# Patient Record
Sex: FEMALE | Race: BLACK OR AFRICAN AMERICAN | NOT HISPANIC OR LATINO | Employment: OTHER | URBAN - METROPOLITAN AREA
[De-identification: names, ages, dates, MRNs, and addresses within clinical notes are randomized per-mention and may not be internally consistent; named-entity substitution may affect disease eponyms.]

---

## 2017-10-19 ENCOUNTER — HOSPITAL ENCOUNTER (EMERGENCY)
Facility: HOSPITAL | Age: 42
Discharge: ANOTHER HOSPITAL | End: 2017-10-20
Attending: EMERGENCY MEDICINE
Payer: MEDICAID

## 2017-10-19 DIAGNOSIS — J96.90 RESPIRATORY FAILURE: Primary | ICD-10-CM

## 2017-10-19 DIAGNOSIS — Z98.890 HISTORY OF ESOPHAGEAL DILATATION: ICD-10-CM

## 2017-10-19 DIAGNOSIS — T17.908A ASPIRATION INTO AIRWAY, INITIAL ENCOUNTER: ICD-10-CM

## 2017-10-19 LAB
ALBUMIN SERPL BCP-MCNC: 3.2 G/DL
ALLENS TEST: ABNORMAL
ALLENS TEST: ABNORMAL
ALP SERPL-CCNC: 126 U/L
ALT SERPL W/O P-5'-P-CCNC: 50 U/L
ANION GAP SERPL CALC-SCNC: 9 MMOL/L
AST SERPL-CCNC: 27 U/L
B-HCG UR QL: NEGATIVE
BASOPHILS # BLD AUTO: 0.03 K/UL
BASOPHILS NFR BLD: 0.2 %
BILIRUB SERPL-MCNC: <0.1 MG/DL
BILIRUB UR QL STRIP: NEGATIVE
BNP SERPL-MCNC: 66 PG/ML
BUN SERPL-MCNC: 19 MG/DL
CALCIUM SERPL-MCNC: 8.8 MG/DL
CHLORIDE SERPL-SCNC: 103 MMOL/L
CLARITY UR REFRACT.AUTO: CLEAR
CO2 SERPL-SCNC: 27 MMOL/L
COLOR UR AUTO: YELLOW
CREAT SERPL-MCNC: 0.6 MG/DL
D DIMER PPP IA.FEU-MCNC: 6.5 MG/L FEU
DELSYS: ABNORMAL
DELSYS: ABNORMAL
DIFFERENTIAL METHOD: ABNORMAL
EOSINOPHIL # BLD AUTO: 0.1 K/UL
EOSINOPHIL NFR BLD: 0.6 %
ERYTHROCYTE [DISTWIDTH] IN BLOOD BY AUTOMATED COUNT: 17.2 %
ERYTHROCYTE [SEDIMENTATION RATE] IN BLOOD BY WESTERGREN METHOD: 16 MM/H
EST. GFR  (AFRICAN AMERICAN): >60 ML/MIN/1.73 M^2
EST. GFR  (NON AFRICAN AMERICAN): >60 ML/MIN/1.73 M^2
FIO2: 65
GLUCOSE SERPL-MCNC: 181 MG/DL
GLUCOSE UR QL STRIP: NEGATIVE
HCO3 UR-SCNC: 29.4 MMOL/L (ref 24–28)
HCO3 UR-SCNC: 31.3 MMOL/L (ref 24–28)
HCT VFR BLD AUTO: 37.6 %
HGB BLD-MCNC: 12.1 G/DL
HGB UR QL STRIP: NEGATIVE
KETONES UR QL STRIP: NEGATIVE
LACTATE SERPL-SCNC: 0.6 MMOL/L
LEUKOCYTE ESTERASE UR QL STRIP: NEGATIVE
LYMPHOCYTES # BLD AUTO: 2.5 K/UL
LYMPHOCYTES NFR BLD: 13.2 %
MCH RBC QN AUTO: 25.5 PG
MCHC RBC AUTO-ENTMCNC: 32.2 G/DL
MCV RBC AUTO: 79 FL
MODE: ABNORMAL
MODE: ABNORMAL
MONOCYTES # BLD AUTO: 0.9 K/UL
MONOCYTES NFR BLD: 4.8 %
NEUTROPHILS # BLD AUTO: 15 K/UL
NEUTROPHILS NFR BLD: 81 %
NITRITE UR QL STRIP: NEGATIVE
PCO2 BLDA: 50.8 MMHG (ref 35–45)
PCO2 BLDA: 98.1 MMHG (ref 35–45)
PEEP: 5
PH SMN: 7.11 [PH] (ref 7.35–7.45)
PH SMN: 7.37 [PH] (ref 7.35–7.45)
PH UR STRIP: 6 [PH] (ref 5–8)
PLATELET # BLD AUTO: 342 K/UL
PMV BLD AUTO: 10.5 FL
PO2 BLDA: 58 MMHG (ref 80–100)
PO2 BLDA: 63 MMHG (ref 80–100)
POC BE: 2 MMOL/L
POC BE: 4 MMOL/L
POC SATURATED O2: 76 % (ref 95–100)
POC SATURATED O2: 91 % (ref 95–100)
POTASSIUM SERPL-SCNC: 4 MMOL/L
PROT SERPL-MCNC: 7.8 G/DL
PROT UR QL STRIP: NEGATIVE
RBC # BLD AUTO: 4.74 M/UL
SAMPLE: ABNORMAL
SAMPLE: ABNORMAL
SITE: ABNORMAL
SITE: ABNORMAL
SODIUM SERPL-SCNC: 139 MMOL/L
SP GR UR STRIP: >=1.03 (ref 1–1.03)
SP02: 95
URN SPEC COLLECT METH UR: ABNORMAL
UROBILINOGEN UR STRIP-ACNC: NEGATIVE EU/DL
VT: 400
WBC # BLD AUTO: 18.55 K/UL

## 2017-10-19 PROCEDURE — 51702 INSERT TEMP BLADDER CATH: CPT

## 2017-10-19 PROCEDURE — 83880 ASSAY OF NATRIURETIC PEPTIDE: CPT

## 2017-10-19 PROCEDURE — 93010 ELECTROCARDIOGRAM REPORT: CPT | Mod: ,,, | Performed by: INTERNAL MEDICINE

## 2017-10-19 PROCEDURE — 83605 ASSAY OF LACTIC ACID: CPT

## 2017-10-19 PROCEDURE — 63600175 PHARM REV CODE 636 W HCPCS

## 2017-10-19 PROCEDURE — 85025 COMPLETE CBC W/AUTO DIFF WBC: CPT

## 2017-10-19 PROCEDURE — 31500 INSERT EMERGENCY AIRWAY: CPT

## 2017-10-19 PROCEDURE — 80053 COMPREHEN METABOLIC PANEL: CPT

## 2017-10-19 PROCEDURE — 85379 FIBRIN DEGRADATION QUANT: CPT

## 2017-10-19 PROCEDURE — 25000003 PHARM REV CODE 250: Performed by: EMERGENCY MEDICINE

## 2017-10-19 PROCEDURE — 99285 EMERGENCY DEPT VISIT HI MDM: CPT | Mod: 25

## 2017-10-19 PROCEDURE — 63600175 PHARM REV CODE 636 W HCPCS: Performed by: EMERGENCY MEDICINE

## 2017-10-19 PROCEDURE — 81003 URINALYSIS AUTO W/O SCOPE: CPT

## 2017-10-19 PROCEDURE — 81025 URINE PREGNANCY TEST: CPT

## 2017-10-19 PROCEDURE — 99900026 HC AIRWAY MAINTENANCE (STAT)

## 2017-10-19 PROCEDURE — 87040 BLOOD CULTURE FOR BACTERIA: CPT | Mod: 59

## 2017-10-19 PROCEDURE — 96375 TX/PRO/DX INJ NEW DRUG ADDON: CPT

## 2017-10-19 PROCEDURE — 96365 THER/PROPH/DIAG IV INF INIT: CPT | Mod: 59

## 2017-10-19 PROCEDURE — 99900035 HC TECH TIME PER 15 MIN (STAT)

## 2017-10-19 RX ORDER — TRAZODONE HYDROCHLORIDE 100 MG/1
100 TABLET ORAL NIGHTLY
COMMUNITY

## 2017-10-19 RX ORDER — PROPOFOL 10 MG/ML
10 INJECTION, EMULSION INTRAVENOUS
Status: COMPLETED | OUTPATIENT
Start: 2017-10-19 | End: 2017-10-19

## 2017-10-19 RX ORDER — ZOLPIDEM TARTRATE 12.5 MG/1
12.5 TABLET, FILM COATED, EXTENDED RELEASE ORAL NIGHTLY PRN
COMMUNITY

## 2017-10-19 RX ORDER — LORAZEPAM 1 MG/1
1 TABLET ORAL EVERY 6 HOURS PRN
COMMUNITY

## 2017-10-19 RX ORDER — MIDAZOLAM HYDROCHLORIDE 1 MG/ML
2 INJECTION INTRAMUSCULAR; INTRAVENOUS
Status: COMPLETED | OUTPATIENT
Start: 2017-10-19 | End: 2017-10-19

## 2017-10-19 RX ORDER — VALSARTAN 80 MG/1
80 TABLET ORAL DAILY
Status: ON HOLD | COMMUNITY
End: 2018-02-28 | Stop reason: CLARIF

## 2017-10-19 RX ORDER — OLANZAPINE 10 MG/1
15 TABLET ORAL NIGHTLY
COMMUNITY

## 2017-10-19 RX ORDER — SUCCINYLCHOLINE CHLORIDE 20 MG/ML
50 INJECTION INTRAMUSCULAR; INTRAVENOUS
Status: COMPLETED | OUTPATIENT
Start: 2017-10-19 | End: 2017-10-19

## 2017-10-19 RX ORDER — SODIUM CHLORIDE 9 MG/ML
125 INJECTION, SOLUTION INTRAVENOUS ONCE
Status: COMPLETED | OUTPATIENT
Start: 2017-10-19 | End: 2017-10-19

## 2017-10-19 RX ORDER — ZIPRASIDONE HYDROCHLORIDE 20 MG/1
20 CAPSULE ORAL 2 TIMES DAILY
COMMUNITY

## 2017-10-19 RX ORDER — MIDAZOLAM HYDROCHLORIDE 1 MG/ML
INJECTION INTRAMUSCULAR; INTRAVENOUS
Status: COMPLETED
Start: 2017-10-19 | End: 2017-10-19

## 2017-10-19 RX ORDER — FLUPHENAZINE HYDROCHLORIDE 10 MG/1
10 TABLET ORAL DAILY
COMMUNITY

## 2017-10-19 RX ORDER — PROPOFOL 10 MG/ML
INJECTION, EMULSION INTRAVENOUS
Status: DISCONTINUED
Start: 2017-10-19 | End: 2017-10-20 | Stop reason: HOSPADM

## 2017-10-19 RX ADMIN — MIDAZOLAM HYDROCHLORIDE 2 MG: 1 INJECTION, SOLUTION INTRAMUSCULAR; INTRAVENOUS at 08:10

## 2017-10-19 RX ADMIN — SUCCINYLCHOLINE CHLORIDE 50 MG: 20 INJECTION, SOLUTION INTRAMUSCULAR; INTRAVENOUS at 08:10

## 2017-10-19 RX ADMIN — PROPOFOL 10 MCG/KG/MIN: 10 INJECTION, EMULSION INTRAVENOUS at 09:10

## 2017-10-19 RX ADMIN — IOHEXOL 100 ML: 350 INJECTION, SOLUTION INTRAVENOUS at 11:10

## 2017-10-19 RX ADMIN — SODIUM CHLORIDE 125 ML/HR: 0.9 INJECTION, SOLUTION INTRAVENOUS at 10:10

## 2017-10-19 RX ADMIN — LORAZEPAM 1 MG: 2 INJECTION, SOLUTION INTRAMUSCULAR; INTRAVENOUS at 08:10

## 2017-10-19 RX ADMIN — MIDAZOLAM HYDROCHLORIDE 2 MG: 1 INJECTION INTRAMUSCULAR; INTRAVENOUS at 08:10

## 2017-10-20 VITALS
OXYGEN SATURATION: 99 % | DIASTOLIC BLOOD PRESSURE: 61 MMHG | WEIGHT: 90 LBS | SYSTOLIC BLOOD PRESSURE: 100 MMHG | TEMPERATURE: 95 F | RESPIRATION RATE: 16 BRPM | HEART RATE: 86 BPM

## 2017-10-20 PROCEDURE — 82803 BLOOD GASES ANY COMBINATION: CPT

## 2017-10-20 PROCEDURE — 63600175 PHARM REV CODE 636 W HCPCS: Performed by: EMERGENCY MEDICINE

## 2017-10-20 PROCEDURE — 31500 INSERT EMERGENCY AIRWAY: CPT

## 2017-10-20 PROCEDURE — 93005 ELECTROCARDIOGRAM TRACING: CPT

## 2017-10-20 PROCEDURE — 99900026 HC AIRWAY MAINTENANCE (STAT)

## 2017-10-20 PROCEDURE — 27100108

## 2017-10-20 PROCEDURE — 25500020 PHARM REV CODE 255: Performed by: EMERGENCY MEDICINE

## 2017-10-20 PROCEDURE — 94002 VENT MGMT INPAT INIT DAY: CPT

## 2017-10-20 PROCEDURE — 99900035 HC TECH TIME PER 15 MIN (STAT)

## 2017-10-20 PROCEDURE — 36600 WITHDRAWAL OF ARTERIAL BLOOD: CPT | Mod: 59

## 2017-10-20 RX ADMIN — PIPERACILLIN AND TAZOBACTAM 2.25 G: 4; .5 INJECTION, POWDER, LYOPHILIZED, FOR SOLUTION INTRAVENOUS; PARENTERAL at 12:10

## 2017-10-20 NOTE — ED PROVIDER NOTES
Encounter Date: 10/19/2017       History     Chief Complaint   Patient presents with    Respiratory Problem     Per [facility], Pt has PEG tube and Nursing Home thinks patient swallowed something.     Nursing home resident with fairly severe debility from cerebral palsy, PEG tube, apparently nonverbal at baseline.  Per family and nursing home staff, has a history of getting things and putting them in her mouth even though she is not supposed to have oral intake.  She was found to have respiratory difficulty, no direct evidence of aspiration or putting anything in her mouth, no other presenting history or complaints.  No stridor noted, no emesis, initial oxygen saturation saturations difficult to register but varied between 50s and 90s.  Corrected quickly to upper 90s and 100 with supplemental oxygen.  Noted on arrival to be alert, moderately distressed, thrashing around, with significant diffuse jugular venous distention, and poor cooperation with measures.  Early look in oropharynx reveals no evidence of foreign body or other material, but unable to directly visualize cords on unsedated exam.  Careful auscultation does not reveal decreased air entry, wheeze, tachypnea, stridor. Difficulty maintaining adequate oxygen saturation lead II early further reexamination of the oropharynx, this time significant food material was discovered, see intubation note.      The history is provided by the EMS personnel, the nursing home and a relative. No  was used.     Review of patient's allergies indicates:  No Known Allergies  Past Medical History:   Diagnosis Date    Aphasia     Cerebral palsy     Deaf      History reviewed. No pertinent surgical history.  History reviewed. No pertinent family history.  Social History   Substance Use Topics    Smoking status: Not on file    Smokeless tobacco: Not on file    Alcohol use Not on file     Review of Systems   Unable to perform ROS: Patient nonverbal        Physical Exam     Initial Vitals [10/19/17 2013]   BP Pulse Resp Temp SpO2   (!) 164/109 100 (!) 22 -- (!) 90 %      MAP       127.33         Physical Exam    Nursing note and vitals reviewed.  Constitutional: She is not diaphoretic. She appears distressed.   HENT:   Head: Normocephalic and atraumatic.   Mouth/Throat: No oropharyngeal exudate.   Nonsedated exam shortly after arrival using laryngoscope reveals good visibility of the posterior oropharynx and epiglottis with no evidence of foreign body, emesis, or other material.  Due to patient resistance, unable to directly visualize the vocal cords, would need sedation for that.   Eyes: Conjunctivae and EOM are normal. Pupils are equal, round, and reactive to light. Right eye exhibits no discharge. Left eye exhibits no discharge. No scleral icterus.   Neck: Normal range of motion. Neck supple. No thyromegaly present. No tracheal deviation present. No JVD present.   Cardiovascular: Normal rate, regular rhythm, normal heart sounds and intact distal pulses. Exam reveals no gallop and no friction rub.    No murmur heard.  Significant JVD   Pulmonary/Chest: Breath sounds normal. No stridor. No respiratory distress. She has no wheezes. She has no rhonchi. She has no rales. She exhibits no tenderness.   No stridor, rales, rhonchi, or wheezing is appreciated.  Fairly good air entry bilaterally.  Symmetric in all lung fields.   Abdominal: Soft. Bowel sounds are normal. She exhibits no distension and no mass. There is no tenderness. There is no rebound and no guarding.   Musculoskeletal: Normal range of motion. She exhibits no edema or tenderness.   Diffuse moderately severe muscular wasting   Neurological: She is alert and oriented to person, place, and time. She has normal strength.   Deaf/ moderately severe debility from CP/ alert/ resists all exam/ interventions   Skin: Skin is warm and dry. No rash and no abscess noted. No erythema.   Psychiatric: She has a normal  mood and affect. Her behavior is normal. Judgment and thought content normal.       20:30 - I inserted 18 ga EJ peripheral IV in left EJ due to difficult peripheral access; standard sterile technique without complication.    ED Course   Intubation  Date/Time: 10/19/2017 9:01 PM  Location procedure was performed: [facility] EMERGENCY DEPARTMENT  Performed by: PARISH LOZANO  Authorized by: PARISH LOZANO   Pre-operative diagnosis: Respiratory failure/ suspected aspiration  Post-operative diagnosis: Same  Consent Done: Emergent Situation  Indications: respiratory failure  Description of findings: Particulate matter found at and around the vocal cords including fairly large pieces of fruits such as orange or pH, and some gastric material suggestive of regurgitated feeding tube material.  No explicit foreign body found in the proximal trachea.   Intubation method: direct  Patient status: paralyzed (RSI)  Preoxygenation: bag valve mask  Pretreatment medications: midazolam  Paralytic: succinylcholine  Laryngoscope size: Howard 4  Tube size: 7.0 mm  Tube type: cuffed  Number of attempts: 1  Ventilation between attempts: BVM  Cricoid pressure: no  Cords visualized: yes  Post-procedure assessment: chest rise and CO2 detector  Breath sounds: equal and rales/crackles  Cuff inflated: yes  ETT to lip: 23 cm  ETT to teeth: 24 cm  Tube secured with: umbilical tape  Chest x-ray interpreted by me.  Chest x-ray findings: endotracheal tube in appropriate position  Patient tolerance: Patient tolerated the procedure well with no immediate complications  Complications: No  Estimated blood loss (mL): 0  Specimens: No  Implants: No  Comments: Fairly extensive foreign material removed by direct visualization and suction extraction from the posterior oropharynx and around the vocal cords.  Rhythm remains sinus and blood pressure remained normal throughout, chassis heart rate in the 120s to 130s.  After intubation, oxygen saturation corrected  to 100% with reduction in heart rate to upper normal sinus.        Labs Reviewed   CBC W/ AUTO DIFFERENTIAL - Abnormal; Notable for the following:        Result Value    WBC 18.55 (*)     MCV 79 (*)     MCH 25.5 (*)     RDW 17.2 (*)     Gran # 15.0 (*)     Gran% 81.0 (*)     Lymph% 13.2 (*)     All other components within normal limits   COMPREHENSIVE METABOLIC PANEL - Abnormal; Notable for the following:     Glucose 181 (*)     Albumin 3.2 (*)     Total Bilirubin <0.1 (*)     ALT 50 (*)     All other components within normal limits   D DIMER, QUANTITATIVE - Abnormal; Notable for the following:     D-Dimer 6.50 (*)     All other components within normal limits   URINALYSIS - Abnormal; Notable for the following:     Specific Gravity, UA >=1.030 (*)     All other components within normal limits   ISTAT PROCEDURE - Abnormal; Notable for the following:     POC PH 7.112 (*)     POC PCO2 98.1 (*)     POC PO2 58 (*)     POC HCO3 31.3 (*)     POC SATURATED O2 76 (*)     All other components within normal limits   ISTAT PROCEDURE - Abnormal; Notable for the following:     POC PCO2 50.8 (*)     POC PO2 63 (*)     POC HCO3 29.4 (*)     POC SATURATED O2 91 (*)     All other components within normal limits   CULTURE, BLOOD   CULTURE, BLOOD   B-TYPE NATRIURETIC PEPTIDE   LACTIC ACID, PLASMA   PREGNANCY TEST, URINE RAPID   POCT GLUCOSE MONITORING CONTINUOUS     EKG Readings: (Independently Interpreted)   Normal sinus rhythm at 88 bpm, rightward axis, prolonged QT, slightly noisy baseline, otherwise unremarkable.  Borderline EKG.  No previous to compare.       Imaging Results          X-Ray Chest AP Portable (Final result)  Result time 10/19/17 21:59:55    Final result by Flor Lott MD (Timothy) (10/19/17 21:59:55)                 Impression:     Normal sized heart.Endotracheal tube appears to be in good position. Nasogastric tube is folded upon itself tip in the level of the mid to distal esophagus.  Peripheral lungs are  clear.      Electronically signed by: STEVIE BANEGAS MD  Date:     10/19/17  Time:    21:59              Narrative:    Chest, 1 view.    Clinical History: Endotracheal tube placement                             X-Ray Chest AP Portable (Final result)  Result time 10/19/17 20:56:50    Final result by Stevie Banegas MD (Timothy) (10/19/17 20:56:50)                 Impression:     Normal sized heart.Prominent mediastinum.  This could be related to a distended esophagus.  Other mediastinal mass cannot be excluded.  Correlate. If the patient does not have history of achalasia, would consider further evaluation with a CT scan of the chest.  The peripheral lungs appear clear.      Electronically signed by: STEVIE BANEGAS MD  Date:     10/19/17  Time:    20:56              Narrative:    Chest, 1 view.    Clinical History: Chest pain                                   Medical Decision Making:   History:   I obtained history from: someone other than patient.                   ED Course      9:08 PM See intubation note. Improved VS & oxygenation. Biting ETT.    9:23 PM ETT suctioned further, no gross particulate seen. Bite block in place, sedated with propofol, stable VS & O2 sats.    9:27 PM Family reports recently admitted/ intubated at [facility] under similar circumstances.    9:33 PM ETT enters right mainstem bronchus; NGT coiled in apparently dilated esophagus; both being repositioned.    9:43 PM ETT in good position; unable to place NGT in proper position - d/c'd.    10:58 PM Clinically stable; requiring significant sedation; CTA chest ordered.    11:03 PM ABG improved; titrating FIO2.    11:10 PM Discussed with family - at bedside - she has previous h/o similar episodes, sneaks other patient's food and aspirates.    11:41 PM Discussed the need for transfer with the patient's family who have verbalized understanding.  She is being transferred for inpatient intensive care services which are not available at this facility.   She'll be transferred by critical care unit with [facility], intubated in route with routine cardiopulmonary and sedation monitoring.  She will continue diprivan sedation en route, cardiac monitoring, IV fluids, IV antibiotics, and oxygen as initiated.  She is accepted for transfer to [facility] as an ER to ER transfer by Dr. Baker.  Risk of transfer includes worsening of condition and death.  Benefit of transfer includes appropriate inpatient care and treatment of recurrent aspiration with respiratory failure.    Clinical Impression:     1. Respiratory failure    2. Aspiration into airway, initial encounter                                 Vamsi Denton MD  10/19/17 2861

## 2017-10-20 NOTE — ED NOTES
Bed: 12  Expected date:   Expected time:   Means of arrival:   Comments:  RANJAN Denton MD  10/19/17 2005

## 2017-10-20 NOTE — ED NOTES
Patient arrived to ER via ambulance. Patient in respiratory distress, md notified, hand bagging began. MD arrived at bedside to assess patient. Patient sx orally for copious amounts of secretions and food particles. Patient breathing on her own, hand bagging stopped.  2046-ABG drawn  2050- No respirations noted, Hand bagging resumed.  2054- Patient intubated with 7.0 ET tube secured 22 cm at the teeth. Patient continues to be hand bagged. ET tube sx for copious thick secretions. HR remains stable.  2130- Patient placed on vent with the following settings: AC, vt 400, rate 16, peep +5 and FIO2 100%.  2230- FIO2 titrated down to 65%  2301- Repeat ABG drawn. Will continue to monitor patient.

## 2017-10-20 NOTE — ED NOTES
Attempted to insert OG, tube continues to coil in esophagus via x-ray. Attempted NG tube multiple times and tube continues to coil in esophagus as visualized on x-ray. MD Denton states ok to remove NG/OG tube.

## 2017-10-20 NOTE — ED NOTES
Unable to obtain temperature with triage vital signs. Temp not reading axillary and unable to obtain oral temp due to patient uncooperative.

## 2017-10-25 LAB
BACTERIA BLD CULT: NORMAL
BACTERIA BLD CULT: NORMAL

## 2017-11-09 ENCOUNTER — HOSPITAL ENCOUNTER (INPATIENT)
Facility: HOSPITAL | Age: 42
LOS: 5 days | Discharge: NURSING, REHAB OR LONG-TERM CARE | DRG: 208 | End: 2017-11-14
Attending: EMERGENCY MEDICINE | Admitting: FAMILY MEDICINE
Payer: MEDICAID

## 2017-11-09 DIAGNOSIS — Z01.818 ENCOUNTER FOR INTUBATION: ICD-10-CM

## 2017-11-09 DIAGNOSIS — J96.00 ACUTE RESPIRATORY FAILURE: ICD-10-CM

## 2017-11-09 DIAGNOSIS — R06.89 RESPIRATORY INSUFFICIENCY: Primary | ICD-10-CM

## 2017-11-09 DIAGNOSIS — K22.9 ABNORMALITY OF ESOPHAGUS: ICD-10-CM

## 2017-11-09 DIAGNOSIS — R00.1 BRADYCARDIA: ICD-10-CM

## 2017-11-09 DIAGNOSIS — J96.90 RESPIRATORY FAILURE, UNSPECIFIED CHRONICITY, UNSPECIFIED WHETHER WITH HYPOXIA OR HYPERCAPNIA: ICD-10-CM

## 2017-11-09 PROBLEM — E43 SEVERE PROTEIN-CALORIE MALNUTRITION: Status: ACTIVE | Noted: 2017-11-09

## 2017-11-09 PROBLEM — G80.9 CEREBRAL PALSY: Status: ACTIVE | Noted: 2017-11-09

## 2017-11-09 PROBLEM — T17.908A ASPIRATION INTO RESPIRATORY TRACT: Status: ACTIVE | Noted: 2017-11-09

## 2017-11-09 LAB
ALBUMIN SERPL BCP-MCNC: 3.3 G/DL
ALLENS TEST: ABNORMAL
ALP SERPL-CCNC: 77 U/L
ALT SERPL W/O P-5'-P-CCNC: 15 U/L
ANION GAP SERPL CALC-SCNC: 13 MMOL/L
AST SERPL-CCNC: 30 U/L
BASOPHILS # BLD AUTO: 0.04 K/UL
BASOPHILS NFR BLD: 0.2 %
BILIRUB SERPL-MCNC: 0.5 MG/DL
BILIRUB UR QL STRIP: NEGATIVE
BNP SERPL-MCNC: 23 PG/ML
BUN SERPL-MCNC: 15 MG/DL
CALCIUM SERPL-MCNC: 8.8 MG/DL
CHLORIDE SERPL-SCNC: 108 MMOL/L
CK SERPL-CCNC: 84 U/L
CLARITY UR REFRACT.AUTO: CLEAR
CO2 SERPL-SCNC: 20 MMOL/L
COLOR UR AUTO: YELLOW
CREAT SERPL-MCNC: 0.7 MG/DL
DELSYS: ABNORMAL
DIFFERENTIAL METHOD: ABNORMAL
EOSINOPHIL # BLD AUTO: 0.1 K/UL
EOSINOPHIL NFR BLD: 0.6 %
ERYTHROCYTE [DISTWIDTH] IN BLOOD BY AUTOMATED COUNT: 16.3 %
ERYTHROCYTE [SEDIMENTATION RATE] IN BLOOD BY WESTERGREN METHOD: 16 MM/H
EST. GFR  (AFRICAN AMERICAN): >60 ML/MIN/1.73 M^2
EST. GFR  (NON AFRICAN AMERICAN): >60 ML/MIN/1.73 M^2
FIO2: 50
GLUCOSE SERPL-MCNC: 106 MG/DL
GLUCOSE UR QL STRIP: NEGATIVE
HCO3 UR-SCNC: 28 MMOL/L (ref 24–28)
HCT VFR BLD AUTO: 40.7 %
HGB BLD-MCNC: 13.3 G/DL
HGB UR QL STRIP: NEGATIVE
KETONES UR QL STRIP: NEGATIVE
LACTATE SERPL-SCNC: 0.7 MMOL/L
LEUKOCYTE ESTERASE UR QL STRIP: NEGATIVE
LYMPHOCYTES # BLD AUTO: 4.3 K/UL
LYMPHOCYTES NFR BLD: 22.6 %
MCH RBC QN AUTO: 25.5 PG
MCHC RBC AUTO-ENTMCNC: 32.7 G/DL
MCV RBC AUTO: 78 FL
MODE: ABNORMAL
MONOCYTES # BLD AUTO: 1.1 K/UL
MONOCYTES NFR BLD: 5.5 %
NEUTROPHILS # BLD AUTO: 13.6 K/UL
NEUTROPHILS NFR BLD: 70.9 %
NITRITE UR QL STRIP: NEGATIVE
PCO2 BLDA: 42.4 MMHG (ref 35–45)
PEEP: 5
PH SMN: 7.43 [PH] (ref 7.35–7.45)
PH UR STRIP: 7 [PH] (ref 5–8)
PLATELET # BLD AUTO: 280 K/UL
PMV BLD AUTO: 11.5 FL
PO2 BLDA: 299 MMHG (ref 80–100)
POC BE: 4 MMOL/L
POC SATURATED O2: 100 % (ref 95–100)
POTASSIUM SERPL-SCNC: 4.8 MMOL/L
PROT SERPL-MCNC: 7.4 G/DL
PROT UR QL STRIP: ABNORMAL
RBC # BLD AUTO: 5.21 M/UL
SAMPLE: ABNORMAL
SITE: ABNORMAL
SODIUM SERPL-SCNC: 141 MMOL/L
SP GR UR STRIP: 1.02 (ref 1–1.03)
TROPONIN I SERPL DL<=0.01 NG/ML-MCNC: <0.006 NG/ML
URN SPEC COLLECT METH UR: ABNORMAL
UROBILINOGEN UR STRIP-ACNC: NEGATIVE EU/DL
VT: 350
WBC # BLD AUTO: 19.17 K/UL

## 2017-11-09 PROCEDURE — 63600175 PHARM REV CODE 636 W HCPCS: Performed by: EMERGENCY MEDICINE

## 2017-11-09 PROCEDURE — 82803 BLOOD GASES ANY COMBINATION: CPT | Performed by: GENERAL PRACTICE

## 2017-11-09 PROCEDURE — 81003 URINALYSIS AUTO W/O SCOPE: CPT

## 2017-11-09 PROCEDURE — 99900035 HC TECH TIME PER 15 MIN (STAT): Performed by: GENERAL PRACTICE

## 2017-11-09 PROCEDURE — 94002 VENT MGMT INPAT INIT DAY: CPT | Performed by: GENERAL PRACTICE

## 2017-11-09 PROCEDURE — 0BH17EZ INSERTION OF ENDOTRACHEAL AIRWAY INTO TRACHEA, VIA NATURAL OR ARTIFICIAL OPENING: ICD-10-PCS | Performed by: EMERGENCY MEDICINE

## 2017-11-09 PROCEDURE — 25000003 PHARM REV CODE 250

## 2017-11-09 PROCEDURE — 63600175 PHARM REV CODE 636 W HCPCS

## 2017-11-09 PROCEDURE — 96365 THER/PROPH/DIAG IV INF INIT: CPT

## 2017-11-09 PROCEDURE — 85025 COMPLETE CBC W/AUTO DIFF WBC: CPT

## 2017-11-09 PROCEDURE — 87186 SC STD MICRODIL/AGAR DIL: CPT

## 2017-11-09 PROCEDURE — 96366 THER/PROPH/DIAG IV INF ADDON: CPT

## 2017-11-09 PROCEDURE — 87040 BLOOD CULTURE FOR BACTERIA: CPT

## 2017-11-09 PROCEDURE — 83605 ASSAY OF LACTIC ACID: CPT

## 2017-11-09 PROCEDURE — 80053 COMPREHEN METABOLIC PANEL: CPT

## 2017-11-09 PROCEDURE — 94761 N-INVAS EAR/PLS OXIMETRY MLT: CPT | Performed by: GENERAL PRACTICE

## 2017-11-09 PROCEDURE — 25000003 PHARM REV CODE 250: Performed by: INTERNAL MEDICINE

## 2017-11-09 PROCEDURE — 83880 ASSAY OF NATRIURETIC PEPTIDE: CPT

## 2017-11-09 PROCEDURE — 20000000 HC ICU ROOM

## 2017-11-09 PROCEDURE — 82550 ASSAY OF CK (CPK): CPT

## 2017-11-09 PROCEDURE — 31500 INSERT EMERGENCY AIRWAY: CPT | Performed by: GENERAL PRACTICE

## 2017-11-09 PROCEDURE — 93010 ELECTROCARDIOGRAM REPORT: CPT | Mod: ,,, | Performed by: INTERNAL MEDICINE

## 2017-11-09 PROCEDURE — 5A1945Z RESPIRATORY VENTILATION, 24-96 CONSECUTIVE HOURS: ICD-10-PCS | Performed by: EMERGENCY MEDICINE

## 2017-11-09 PROCEDURE — 96368 THER/DIAG CONCURRENT INF: CPT

## 2017-11-09 PROCEDURE — 87088 URINE BACTERIA CULTURE: CPT

## 2017-11-09 PROCEDURE — 99900029 HC O2 SETUP (STAT): Performed by: GENERAL PRACTICE

## 2017-11-09 PROCEDURE — 31500 INSERT EMERGENCY AIRWAY: CPT

## 2017-11-09 PROCEDURE — 99291 CRITICAL CARE FIRST HOUR: CPT

## 2017-11-09 PROCEDURE — 87077 CULTURE AEROBIC IDENTIFY: CPT

## 2017-11-09 PROCEDURE — 25000003 PHARM REV CODE 250: Performed by: EMERGENCY MEDICINE

## 2017-11-09 PROCEDURE — 36600 WITHDRAWAL OF ARTERIAL BLOOD: CPT | Performed by: GENERAL PRACTICE

## 2017-11-09 PROCEDURE — 27000221 HC OXYGEN, UP TO 24 HOURS: Performed by: GENERAL PRACTICE

## 2017-11-09 PROCEDURE — 84484 ASSAY OF TROPONIN QUANT: CPT

## 2017-11-09 PROCEDURE — 63600175 PHARM REV CODE 636 W HCPCS: Performed by: INTERNAL MEDICINE

## 2017-11-09 PROCEDURE — 99900026 HC AIRWAY MAINTENANCE (STAT): Performed by: GENERAL PRACTICE

## 2017-11-09 PROCEDURE — 87086 URINE CULTURE/COLONY COUNT: CPT

## 2017-11-09 RX ORDER — HYDRALAZINE HYDROCHLORIDE 20 MG/ML
10 INJECTION INTRAMUSCULAR; INTRAVENOUS EVERY 6 HOURS PRN
Status: DISCONTINUED | OUTPATIENT
Start: 2017-11-09 | End: 2017-11-14 | Stop reason: HOSPADM

## 2017-11-09 RX ORDER — PROPOFOL 10 MG/ML
5 INJECTION, EMULSION INTRAVENOUS CONTINUOUS
Status: DISCONTINUED | OUTPATIENT
Start: 2017-11-09 | End: 2017-11-09 | Stop reason: SDUPTHER

## 2017-11-09 RX ORDER — PROPRANOLOL HYDROCHLORIDE 10 MG/1
10 TABLET ORAL 3 TIMES DAILY
Status: ON HOLD | COMMUNITY
End: 2018-02-28 | Stop reason: CLARIF

## 2017-11-09 RX ORDER — BENZTROPINE MESYLATE 1 MG/1
1 TABLET ORAL 2 TIMES DAILY
Status: ON HOLD | COMMUNITY
End: 2018-02-28 | Stop reason: CLARIF

## 2017-11-09 RX ORDER — RABEPRAZOLE SODIUM 20 MG/1
20 TABLET, DELAYED RELEASE ORAL DAILY
Status: ON HOLD | COMMUNITY
End: 2018-02-28 | Stop reason: CLARIF

## 2017-11-09 RX ORDER — PANTOPRAZOLE SODIUM 40 MG/10ML
40 INJECTION, POWDER, LYOPHILIZED, FOR SOLUTION INTRAVENOUS DAILY
Status: DISCONTINUED | OUTPATIENT
Start: 2017-11-10 | End: 2017-11-14

## 2017-11-09 RX ORDER — ETOMIDATE 2 MG/ML
20 INJECTION INTRAVENOUS
Status: COMPLETED | OUTPATIENT
Start: 2017-11-09 | End: 2017-11-09

## 2017-11-09 RX ORDER — ENOXAPARIN SODIUM 100 MG/ML
30 INJECTION SUBCUTANEOUS EVERY 24 HOURS
Status: DISCONTINUED | OUTPATIENT
Start: 2017-11-09 | End: 2017-11-14 | Stop reason: HOSPADM

## 2017-11-09 RX ORDER — ACETAMINOPHEN 325 MG/1
650 TABLET ORAL EVERY 6 HOURS PRN
Status: DISCONTINUED | OUTPATIENT
Start: 2017-11-09 | End: 2017-11-14 | Stop reason: HOSPADM

## 2017-11-09 RX ORDER — PROPOFOL 10 MG/ML
5 INJECTION, EMULSION INTRAVENOUS CONTINUOUS
Status: DISCONTINUED | OUTPATIENT
Start: 2017-11-09 | End: 2017-11-11

## 2017-11-09 RX ORDER — CIPROFLOXACIN 2 MG/ML
400 INJECTION, SOLUTION INTRAVENOUS
Status: DISCONTINUED | OUTPATIENT
Start: 2017-11-09 | End: 2017-11-10

## 2017-11-09 RX ORDER — PROPOFOL 10 MG/ML
INJECTION, EMULSION INTRAVENOUS
Status: COMPLETED
Start: 2017-11-09 | End: 2017-11-09

## 2017-11-09 RX ORDER — PHENYTOIN 125 MG/5ML
4 SUSPENSION ORAL 3 TIMES DAILY
COMMUNITY
End: 2017-12-10

## 2017-11-09 RX ORDER — MEGESTROL ACETATE 40 MG/1
40 TABLET ORAL DAILY
Status: ON HOLD | COMMUNITY
End: 2018-02-28 | Stop reason: CLARIF

## 2017-11-09 RX ORDER — HYDROXYZINE HYDROCHLORIDE 25 MG/1
25 TABLET, FILM COATED ORAL 3 TIMES DAILY
Status: ON HOLD | COMMUNITY
End: 2018-02-28 | Stop reason: CLARIF

## 2017-11-09 RX ORDER — PROPOFOL 10 MG/ML
5 INJECTION, EMULSION INTRAVENOUS
Status: COMPLETED | OUTPATIENT
Start: 2017-11-09 | End: 2017-11-09

## 2017-11-09 RX ORDER — ONDANSETRON 2 MG/ML
4 INJECTION INTRAMUSCULAR; INTRAVENOUS EVERY 8 HOURS PRN
Status: DISCONTINUED | OUTPATIENT
Start: 2017-11-09 | End: 2017-11-14 | Stop reason: HOSPADM

## 2017-11-09 RX ORDER — SODIUM CHLORIDE 9 MG/ML
INJECTION, SOLUTION INTRAVENOUS CONTINUOUS
Status: DISCONTINUED | OUTPATIENT
Start: 2017-11-09 | End: 2017-11-12

## 2017-11-09 RX ADMIN — ENOXAPARIN SODIUM 30 MG: 100 INJECTION SUBCUTANEOUS at 10:11

## 2017-11-09 RX ADMIN — SODIUM CHLORIDE 1000 ML: 0.9 INJECTION, SOLUTION INTRAVENOUS at 01:11

## 2017-11-09 RX ADMIN — CEFTRIAXONE SODIUM 1 G: 1 INJECTION, POWDER, FOR SOLUTION INTRAMUSCULAR; INTRAVENOUS at 04:11

## 2017-11-09 RX ADMIN — SODIUM CHLORIDE 200 ML: 900 INJECTION, SOLUTION INTRAVENOUS at 02:11

## 2017-11-09 RX ADMIN — ETOMIDATE 20 MG: 2 INJECTION, SOLUTION INTRAVENOUS at 12:11

## 2017-11-09 RX ADMIN — PROPOFOL 35 MCG/KG/MIN: 10 INJECTION, EMULSION INTRAVENOUS at 06:11

## 2017-11-09 RX ADMIN — PROPOFOL 1000 MG: 10 INJECTION, EMULSION INTRAVENOUS at 01:11

## 2017-11-09 RX ADMIN — SODIUM CHLORIDE: 0.9 INJECTION, SOLUTION INTRAVENOUS at 05:11

## 2017-11-09 RX ADMIN — CIPROFLOXACIN 400 MG: 2 INJECTION, SOLUTION INTRAVENOUS at 10:11

## 2017-11-09 RX ADMIN — PROPOFOL 35 MCG/KG/MIN: 10 INJECTION, EMULSION INTRAVENOUS at 07:11

## 2017-11-09 RX ADMIN — PIPERACILLIN SODIUM AND TAZOBACTAM SODIUM 4.5 G: 4; .5 INJECTION, POWDER, LYOPHILIZED, FOR SOLUTION INTRAVENOUS at 10:11

## 2017-11-09 NOTE — ED NOTES
Patient arrived to ED via [facility]. Bag valve mask ventilations in progress.  Agonal respirations noted 8-10 breaths per minute.  Oxygen saturation 100%.  Preparing for intubation. Dr. Ramirez at bedside.
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
## 2017-11-10 ENCOUNTER — ANESTHESIA EVENT (OUTPATIENT)
Dept: ENDOSCOPY | Facility: HOSPITAL | Age: 42
DRG: 208 | End: 2017-11-10
Payer: MEDICAID

## 2017-11-10 PROBLEM — R00.1 BRADYCARDIA: Status: ACTIVE | Noted: 2017-11-10

## 2017-11-10 PROBLEM — G40.909 SEIZURE DISORDER: Chronic | Status: ACTIVE | Noted: 2017-11-10

## 2017-11-10 PROBLEM — T18.128A ESOPHAGEAL OBSTRUCTION DUE TO FOOD IMPACTION: Status: ACTIVE | Noted: 2017-11-10
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
## 2017-11-10 LAB
ALLENS TEST: ABNORMAL
ANION GAP SERPL CALC-SCNC: 7 MMOL/L
BASOPHILS # BLD AUTO: 0.02 K/UL
BASOPHILS NFR BLD: 0.3 %
BUN SERPL-MCNC: 9 MG/DL
CALCIUM SERPL-MCNC: 8.7 MG/DL
CHLORIDE SERPL-SCNC: 113 MMOL/L
CO2 SERPL-SCNC: 21 MMOL/L
CREAT SERPL-MCNC: 0.6 MG/DL
DELSYS: ABNORMAL
DELSYS: ABNORMAL
DIFFERENTIAL METHOD: ABNORMAL
EOSINOPHIL # BLD AUTO: 0.1 K/UL
EOSINOPHIL NFR BLD: 0.9 %
ERYTHROCYTE [DISTWIDTH] IN BLOOD BY AUTOMATED COUNT: 15.8 %
ERYTHROCYTE [SEDIMENTATION RATE] IN BLOOD BY WESTERGREN METHOD: 16 MM/H
ERYTHROCYTE [SEDIMENTATION RATE] IN BLOOD BY WESTERGREN METHOD: 16 MM/H
EST. GFR  (AFRICAN AMERICAN): >60 ML/MIN/1.73 M^2
EST. GFR  (NON AFRICAN AMERICAN): >60 ML/MIN/1.73 M^2
FIO2: 35
FIO2: 40
GLUCOSE SERPL-MCNC: 95 MG/DL
HCO3 UR-SCNC: 20.6 MMOL/L (ref 24–28)
HCO3 UR-SCNC: 24.9 MMOL/L (ref 24–28)
HCT VFR BLD AUTO: 30.6 %
HGB BLD-MCNC: 9.9 G/DL
LYMPHOCYTES # BLD AUTO: 1.3 K/UL
LYMPHOCYTES NFR BLD: 18.8 %
MAGNESIUM SERPL-MCNC: 1.8 MG/DL
MCH RBC QN AUTO: 25.3 PG
MCHC RBC AUTO-ENTMCNC: 32.4 G/DL
MCV RBC AUTO: 78 FL
MODE: ABNORMAL
MODE: ABNORMAL
MONOCYTES # BLD AUTO: 0.4 K/UL
MONOCYTES NFR BLD: 5.5 %
NEUTROPHILS # BLD AUTO: 5 K/UL
NEUTROPHILS NFR BLD: 74.5 %
PCO2 BLDA: 33.5 MMHG (ref 35–45)
PCO2 BLDA: 40.2 MMHG (ref 35–45)
PEEP: 3
PEEP: 3
PH SMN: 7.32 [PH] (ref 7.35–7.45)
PH SMN: 7.48 [PH] (ref 7.35–7.45)
PHOSPHATE SERPL-MCNC: 2.1 MG/DL
PLATELET # BLD AUTO: 209 K/UL
PMV BLD AUTO: 10.3 FL
PO2 BLDA: 157 MMHG (ref 80–100)
PO2 BLDA: 216 MMHG (ref 80–100)
POC BE: -6 MMOL/L
POC BE: 1 MMOL/L
POC SATURATED O2: 100 % (ref 95–100)
POC SATURATED O2: 99 % (ref 95–100)
POCT GLUCOSE: 126 MG/DL (ref 70–110)
POTASSIUM SERPL-SCNC: 3.4 MMOL/L
PROCALCITONIN SERPL IA-MCNC: 0.12 NG/ML
RBC # BLD AUTO: 3.91 M/UL
SAMPLE: ABNORMAL
SAMPLE: ABNORMAL
SITE: ABNORMAL
SITE: ABNORMAL
SODIUM SERPL-SCNC: 141 MMOL/L
VT: 400
VT: 400
WBC # BLD AUTO: 6.76 K/UL

## 2017-11-10 PROCEDURE — 99291 CRITICAL CARE FIRST HOUR: CPT | Mod: ,,, | Performed by: NURSE PRACTITIONER

## 2017-11-10 PROCEDURE — 36415 COLL VENOUS BLD VENIPUNCTURE: CPT

## 2017-11-10 PROCEDURE — 36600 WITHDRAWAL OF ARTERIAL BLOOD: CPT

## 2017-11-10 PROCEDURE — 99232 SBSQ HOSP IP/OBS MODERATE 35: CPT | Mod: ,,, | Performed by: INTERNAL MEDICINE

## 2017-11-10 PROCEDURE — 25000003 PHARM REV CODE 250: Performed by: INTERNAL MEDICINE

## 2017-11-10 PROCEDURE — 20000000 HC ICU ROOM

## 2017-11-10 PROCEDURE — 87205 SMEAR GRAM STAIN: CPT

## 2017-11-10 PROCEDURE — 83735 ASSAY OF MAGNESIUM: CPT

## 2017-11-10 PROCEDURE — 63600175 PHARM REV CODE 636 W HCPCS

## 2017-11-10 PROCEDURE — 63600175 PHARM REV CODE 636 W HCPCS: Performed by: EMERGENCY MEDICINE

## 2017-11-10 PROCEDURE — 82803 BLOOD GASES ANY COMBINATION: CPT

## 2017-11-10 PROCEDURE — 84145 PROCALCITONIN (PCT): CPT

## 2017-11-10 PROCEDURE — 27200966 HC CLOSED SUCTION SYSTEM

## 2017-11-10 PROCEDURE — 85025 COMPLETE CBC W/AUTO DIFF WBC: CPT

## 2017-11-10 PROCEDURE — 63600175 PHARM REV CODE 636 W HCPCS: Performed by: INTERNAL MEDICINE

## 2017-11-10 PROCEDURE — 97802 MEDICAL NUTRITION INDIV IN: CPT

## 2017-11-10 PROCEDURE — 99900035 HC TECH TIME PER 15 MIN (STAT)

## 2017-11-10 PROCEDURE — 63700000 PHARM REV CODE 250 ALT 637 W/O HCPCS

## 2017-11-10 PROCEDURE — 93010 ELECTROCARDIOGRAM REPORT: CPT | Mod: ,,, | Performed by: INTERNAL MEDICINE

## 2017-11-10 PROCEDURE — 84100 ASSAY OF PHOSPHORUS: CPT

## 2017-11-10 PROCEDURE — 25000003 PHARM REV CODE 250: Performed by: EMERGENCY MEDICINE

## 2017-11-10 PROCEDURE — 94003 VENT MGMT INPAT SUBQ DAY: CPT

## 2017-11-10 PROCEDURE — 87070 CULTURE OTHR SPECIMN AEROBIC: CPT

## 2017-11-10 PROCEDURE — 25000003 PHARM REV CODE 250: Performed by: NURSE PRACTITIONER

## 2017-11-10 PROCEDURE — 80048 BASIC METABOLIC PNL TOTAL CA: CPT

## 2017-11-10 PROCEDURE — 93005 ELECTROCARDIOGRAM TRACING: CPT

## 2017-11-10 PROCEDURE — C9113 INJ PANTOPRAZOLE SODIUM, VIA: HCPCS | Performed by: EMERGENCY MEDICINE

## 2017-11-10 RX ORDER — CHLORHEXIDINE GLUCONATE ORAL RINSE 1.2 MG/ML
15 SOLUTION DENTAL 2 TIMES DAILY
Status: DISCONTINUED | OUTPATIENT
Start: 2017-11-10 | End: 2017-11-11

## 2017-11-10 RX ORDER — ATROPINE SULFATE 1 MG/ML
1 INJECTION, SOLUTION INTRAMUSCULAR; INTRAVENOUS; SUBCUTANEOUS
Status: DISCONTINUED | OUTPATIENT
Start: 2017-11-10 | End: 2017-11-14 | Stop reason: HOSPADM

## 2017-11-10 RX ORDER — PHENYTOIN 125 MG/5ML
100 SUSPENSION ORAL EVERY 8 HOURS
Status: DISCONTINUED | OUTPATIENT
Start: 2017-11-10 | End: 2017-11-14 | Stop reason: HOSPADM

## 2017-11-10 RX ORDER — PHENYTOIN 125 MG/5ML
100 SUSPENSION ORAL EVERY 8 HOURS
Status: DISCONTINUED | OUTPATIENT
Start: 2017-11-10 | End: 2017-11-10

## 2017-11-10 RX ORDER — LORAZEPAM 2 MG/ML
INJECTION INTRAMUSCULAR
Status: COMPLETED
Start: 2017-11-10 | End: 2017-11-10

## 2017-11-10 RX ADMIN — PANTOPRAZOLE SODIUM 40 MG: 40 INJECTION, POWDER, FOR SOLUTION INTRAVENOUS at 09:11

## 2017-11-10 RX ADMIN — PHENYTOIN 100 MG: 125 SUSPENSION ORAL at 05:11

## 2017-11-10 RX ADMIN — PIPERACILLIN SODIUM AND TAZOBACTAM SODIUM 4.5 G: 4; .5 INJECTION, POWDER, LYOPHILIZED, FOR SOLUTION INTRAVENOUS at 08:11

## 2017-11-10 RX ADMIN — VANCOMYCIN HYDROCHLORIDE 750 MG: 1 INJECTION, POWDER, LYOPHILIZED, FOR SOLUTION INTRAVENOUS at 03:11

## 2017-11-10 RX ADMIN — PROPOFOL 30 MCG/KG/MIN: 10 INJECTION, EMULSION INTRAVENOUS at 11:11

## 2017-11-10 RX ADMIN — ENOXAPARIN SODIUM 30 MG: 100 INJECTION SUBCUTANEOUS at 05:11

## 2017-11-10 RX ADMIN — SODIUM CHLORIDE 500 ML: 0.9 INJECTION, SOLUTION INTRAVENOUS at 05:11

## 2017-11-10 RX ADMIN — SODIUM CHLORIDE: 0.9 INJECTION, SOLUTION INTRAVENOUS at 03:11

## 2017-11-10 RX ADMIN — CIPROFLOXACIN 400 MG: 2 INJECTION, SOLUTION INTRAVENOUS at 11:11

## 2017-11-10 RX ADMIN — CHLORHEXIDINE GLUCONATE 15 ML: 1.2 RINSE ORAL at 09:11

## 2017-11-10 RX ADMIN — PHENYTOIN 100 MG: 125 SUSPENSION ORAL at 09:11

## 2017-11-10 RX ADMIN — LORAZEPAM 2 MG: 2 INJECTION INTRAMUSCULAR; INTRAVENOUS at 09:11

## 2017-11-10 RX ADMIN — PROPOFOL 40 MCG/KG/MIN: 10 INJECTION, EMULSION INTRAVENOUS at 03:11

## 2017-11-10 RX ADMIN — SODIUM CHLORIDE 75 ML/HR: 0.9 INJECTION, SOLUTION INTRAVENOUS at 05:11

## 2017-11-10 NOTE — ASSESSMENT & PLAN NOTE
Nutrition Problem  Malnutrition     Related to (etiology):   Inadequate energy intake     Signs and Symptoms (as evidenced by):   Per [facility] records weight loss of 6 lbs within 1 month (6%), per physical assessment ~ muscle mass loss.      Interventions/Recommendations (treatment strategy):  Refer to RD recs above    Nutrition Diagnosis Status:   Continues

## 2017-11-10 NOTE — HPI
42 year old female nursing home resident with PMH severe cerebral palsy, deaf, non-verbal, seizure disorder (not currently on antiepileptics), and  esophageal stricture with history of severe obstructing food bolus and aspiration; she has PEG for nutrition and meds however if food gets within her grasp she is not capable of comprehending the need for NPO status and continues to attempt to eat  She presented to [facility] ED with respiratory distress; she was intubated in ED s/t impending respiratory arrest with poor effort and unable to clear oral secretions  Further evaluation revealed leukocytosis along with CXR showing wide mediastinum with clear lung fields

## 2017-11-10 NOTE — CONSULTS
[facility] -   Adult Nutrition  Consult Note    SUMMARY     Recommendations    Recommendation/Intervention:   1.If unable to extubate, consider Nutren 1.5 at 25 ml/hr with 1 pack of beneprotein, with flushes as needed per MD or NP for hydration. With current propofol infusion, provides 1125 calories, 47 g protein, and 458 ml water.   2.When Pt extubated, consider Isosource 1.5 bolus 250 ml every 6 hours (or 4 cans daily total volume) with 50 ml flush before and after each can plus additional water flushes of 150 ml BID for hydration or as needed per MD (1500 kcal, 68 g protein, 1464 ml water).    3. Hold for residuals >500 mL 4. Will continue to monitor.     Goals: 1. Initiate nutrition within 72 hrs. 2. Meet 85- 100 % of estimated nutritional needs without s/s of GI distress   Nutrition Goal Status: new  Communication of RD Recs:  (care plan and sticky note)      Reason for Assessment    Reason for Assessment: physician consult   Diagnosis:  1. Respiratory insufficiency    2. Encounter for intubation    3. Respiratory failure, unspecified chronicity, unspecified whether with hypoxia or hypercapnia    4. Abnormality of esophagus    5. Acute respiratory failure    6. Bradycardia      Past Medical History:   Diagnosis Date    Anxiety     Aphasia     Cerebral palsy     Deaf     Depression     GERD (gastroesophageal reflux disease)     Insomnia     Pressure ulcer     Seizures            Interdisciplinary Rounds: attended     General Information Comments:  Per ICU staff: Patient intubated and sedated, patient is a resident of [facility], patient has a PEG for nutrition support. Pt seen this AM, no family members at bedside. Per epic records weight loss of 6 lbs within 1 month (6%), per physical assessment ~ muscle mass loss.     Nutrition Discharge Planning:  Isosource 1.5 bolus 250 ml every 6 hours (or 4 cans daily total volume) with 50 ml flush before and after each can plus additional water  flushes of 150 ml BID for hydration or as needed per MD (1500 kcal, 68 g protein, 1464 ml water).      Nutrition Prescription Ordered    Current Diet Order: NPO          Evaluation of Received Nutrients/Fluid Intake       Other Calories (kcal): 200.64 (Propofol 7.6 ml/hr)  Energy Calories Required: not meeting needs  % Kcal Needs: 18      Protein Required: not meeting needs  % Protein Needs: 0      % Intake of Estimated Energy Needs: 0 - 25 %  % Meal Intake: NPO     Nutrition Risk Screen     Nutrition Risk Screen: tube feeding or parenteral nutrition    Nutrition/Diet History       Typical Food/Fluid Intake: ALVERTO  Food Preferences: ALVERTO      Factors Affecting Nutritional Intake: NPO        Labs/Tests/Procedures/Meds       Pertinent Labs Reviewed: reviewed  Pertinent Labs Comments: Alb 3.3, Co2 20  Pertinent Medications Reviewed: reviewed       Physical Findings    Overall Physical Appearance: loss of muscle mass  Tubes:  (PEG)  Oral/Mouth Cavity:  (ALVERTO)  Skin:  (Gomez 11)    Anthropometrics    Temp: 96.1 °F (35.6 °C)     Height: 5' (152.4 cm)  Weight Method: Bed Scale  Weight: 38.4 kg (84 lb 10.5 oz)     Ideal Body Weight (IBW), Female: 100 lb     % Ideal Body Weight, Female (lb): 84.66 lb  BMI (Calculated): 16.6  BMI Grade: 16 - 16.9 protein-energy malnutrition grade II     Usual Body Weight (UBW), k.9 kg     % Usual Body Weight: 94.08  % Weight Change From Usual Weight: -6.11 %             Estimated/Assessed Needs    Weight Used For Calorie Calculations: 38.4 kg (84 lb 10.5 oz)   Height (cm): 152.4 cm  Energy Calorie Requirements (kcal): 1067  Energy Need Method: [place]    RMR ([place]-St. Jeor Equation): 975.5      Weight Used For Protein Calculations: 38.4 kg (84 lb 10.5 oz)     1.2 gm Protein (gm): 46.18 and 1.5 gm Protein (gm): 57.72     Fluid Need Method: RDA Method (or per MD)        RDA Method (mL): 1067               Assessment and Plan    * Acute respiratory failure    Nutrition  Problem  Inadequate energy intake    Related to (etiology):   Intubation     Signs and Symptoms (as evidenced by):    NPO, no alternative means of nutrition.     Interventions/Recommendations (treatment strategy):  See RD recs above    Nutrition Diagnosis Status:   New          Severe protein-calorie malnutrition    Nutrition Problem  Malnutrition     Related to (etiology):   Inadequate energy intake     Signs and Symptoms (as evidenced by):   Per epic records weight loss of 6 lbs within 1 month (6%), per physical assessment ~ muscle mass loss.      Interventions/Recommendations (treatment strategy):  Refer to RD recs above    Nutrition Diagnosis Status:   New              Monitor and Evaluation    Food and Nutrient Intake: energy intake, enteral nutrition intake  Food and Nutrient Adminstration: enteral and parenteral nutrition administration  Anthropometric Measurements: weight  Biochemical Data, Medical Tests and Procedures: electrolyte and renal panel, glucose/endocrine profile  Nutrition-Focused Physical Findings: overall appearance    Nutrition Follow-Up    RD Follow-up?: Yes (2xweekly)

## 2017-11-10 NOTE — ASSESSMENT & PLAN NOTE
Per report, large amount of oral secretions suctioned out.  Will empirically cover with broad spectrum IV antibiotics tonight.  De-escalate in AM as necessary.  Follow up on cultures.  No evidence of pneumonia of CXR.  WBC 19,000.

## 2017-11-10 NOTE — CONSULTS
Belinda Urbina [id number] is a 42 y.o. female who has been consulted for vancomycin dosing.  Dx: Pneumonia / goal trough 15-20  The patient has the following labs:     Date Creatinine (mg/dl)    BUN WBC Count   11/10/2017 Estimated Creatinine Clearance: 64.8 mL/min (based on SCr of 0.7 mg/dL). Lab Results   Component Value Date    BUN 15 11/09/2017     Lab Results   Component Value Date    WBC 19.17 (H) 11/09/2017      which calculates to an Estimated Creatinine Clearance: 64.8 mL/min (based on SCr of 0.7 mg/dL)..       Current weight is 38.4 kg (84 lb 10.5 oz)  Loading dose of 750mg  The patient will be started on vancomycin at a dose of 500 mg every 18 hours. Patient will be followed by pharmacy for changes in renal function, toxicity, and efficacy.  The vancomycin trough has been ordered for 11/11 at 14:30.      Thank you for allowing us to participate in this patient's care.     Chano Braga

## 2017-11-10 NOTE — HOSPITAL COURSE
11/10 - Transferred to ICU with OETT on mechanical ventilation and propofol sedation  11/11: EGD with removal of large amount secretion and scant solid food; tolerated SBT and extubation

## 2017-11-10 NOTE — ASSESSMENT & PLAN NOTE
Resident of [facility].  Need to implement stricter precautions at the NH so patient cannot ingest oral food.

## 2017-11-10 NOTE — PROGRESS NOTES
"[facility] Hospital Medicine  Progress Note    Patient Name: Belinda Urbina  MRN: [id number]  Patient Class: IP- Inpatient   Admission Date: 11/9/2017  Length of Stay: 1 days  Attending Physician: Susan Yang MD  Primary Care Provider: Santhosh Mendiola MD        Subjective:     Principal Problem:Acute respiratory failure    HPI:  Ms. Urbina is an unfortunate 41 y/o AA female with h/o cerebral palsy, aphasic, deafness was transferred from [facility] ED, intubated. No family around, history obtained from chart review.    Patient is a resident of [facility], has a PEG in place but continues to somehow get her hands on food and eats them. Per chart review she is supposed to be NPO at all times. She had aspirated in the past month and went into respiratory failure, was emergently intubated and was taken to [facility]. She had EGD done at that time, and had large amount of retained food removed from the esophagus. Eventually discharged back to the nursing home. Per chart review, patient had similar episode again earlier today, placed food in her mouth and aspirated. She woas found to have lot of secretions when EMS arrived. She was intubated en route to [facility], and was diverted to [facility] ED. Transferred here to [facility] ICU for further monitoring. Currently she remains intubated on propofol. No family at bedside.      Per H&P done at [facility] on 10/20/2017:  "Ms. Urbina is a 43yo F w/ a PMHx of severe cerebral palsy (deaf, non-verbal), s/p PEG, seizure disorder (previously on dilantin, no anti-epileptics on list from nursing home), and behavioral issues (prior respiratory failure after choking on food/foreign body) who was transferred from [facility] following intubation for acute hypoxemic respiratory failure. Pt was noted to be tachypneic and in distress at nursing home. She has a history of placing food in mouth even though she's not supposed to have any oral intake, and therefore, NH was " "concerned that this happened. On arrival to [facility], pt was noted to have O2 sats in 50's - 90s which corrected readily with supplemental oxygen. Pt's ABG had a pH of 7.1, CO2 of 98, O2 of 58, and HCO3 of 31. Physicians there were unable to evaluate vocal cords without sedated exam, and therefore, pt was intubated. Upon intubation, it was noted that pt had a large amount of fruit as well as aspirated tube feeds around her vocal cords. At [facility], pt was noted to have a leukocytosis of 18; however, the remainder of her labs were unremarkable. She had a CTA completed which was negative for pulmonary embolism but did revealed a patchy consolidation in the left lower lobe as well as marked esophageal dilation with irregularity at the gastroesophageal junction (seen on prior imaging as well, prior scope revealing severe esophagitis and possible gastroparesis, biopsies positive for candida unclear if ever treated for this)".    EGD done 10/22/17 at [facility]:    Esophagus: Very narrowed stricture at distal esophagus. Severe distal esophagitis. White plaque in esophagus, possibly c/w candida. Large amount of fluid and retained food (peaches). Removed with multiple passes with Adam net.    Stomach:Not examined  Duodenum: Not examined  Complications: None    Impression:  1. Severe distal esophagitis with very narrowed distal esophageal stricture.  2. Food bolus status post removal.  3. Possible candidal esophagitis.                     Hospital Course:  11/10 - Intubated and sedated.  Responds to pain. Difficulty with access. ICU NP attempting to place midline under US guidance. ABG this morning 7.47/33.5/216/24.9. Satting 100% on ventilation. WBC 6.76<--19.         Review of Systems   Unable to perform ROS: Intubated     Objective:     Vital Signs (Most Recent):  Temp: 96.1 °F (35.6 °C) (11/10/17 0302)  Pulse: 69 (11/10/17 1044)  Resp: 16 (11/10/17 0831)  BP: 117/82 (11/10/17 0901)  SpO2: 100 % (11/10/17 1044) Vital Signs (24h " Range):  Temp:  [96.1 °F (35.6 °C)-98.7 °F (37.1 °C)] 96.1 °F (35.6 °C)  Pulse:  [] 69  Resp:  [10-44] 16  SpO2:  [97 %-100 %] 100 %  BP: ()/() 117/82     Weight: 38.4 kg (84 lb 10.5 oz)  Body mass index is 16.53 kg/m².    Intake/Output Summary (Last 24 hours) at 11/10/17 1128  Last data filed at 11/10/17 0600   Gross per 24 hour   Intake          1896.36 ml   Output              875 ml   Net          1021.36 ml      Physical Exam   Constitutional: No distress. She is intubated.   Malnourished, cachectic, intubated   HENT:   Head: Normocephalic and atraumatic.   Eyes: Conjunctivae are normal. Pupils are equal, round, and reactive to light. No scleral icterus.   Cardiovascular: Normal rate, regular rhythm, normal heart sounds and intact distal pulses.    No murmur heard.  Pulmonary/Chest: Breath sounds normal. No accessory muscle usage. She is intubated.   Abdominal: Soft. She exhibits no distension.   Musculoskeletal: She exhibits no edema.   Lymphadenopathy:     She has no cervical adenopathy.   Neurological:   Intubated, sedated on propofol   Skin: Skin is warm. She is not diaphoretic. No erythema.   No skin breakdown noted   Nursing note and vitals reviewed.      Significant Labs:   Blood Culture: No results for input(s): LABBLOO in the last 48 hours.  BMP:   Recent Labs  Lab 11/10/17  1019   GLU 95      K 3.4*   *   CO2 21*   BUN 9   CREATININE 0.6   CALCIUM 8.7   MG 1.8     CBC:   Recent Labs  Lab 11/09/17  1410 11/10/17  1019   WBC 19.17* 6.76   HGB 13.3 9.9*   HCT 40.7 30.6*    209      Recent Labs  Lab 11/09/17  1600   LACTATE 0.7     Magnesium:   Recent Labs  Lab 11/10/17  1019   MG 1.8     Respiratory Culture: No results for input(s): GSRESP, RESPIRATORYC in the last 48 hours.  Troponin:   Recent Labs  Lab 11/09/17  1410   TROPONINI <0.006     All pertinent labs within the past 24 hours have been reviewed.    Significant Imaging: I have reviewed and interpreted all  pertinent imaging results/findings within the past 24 hours.     Imaging Results          X-Ray Chest 1 View (Final result)  Result time 11/10/17 08:01:37    Final result by DENIS Bran Sr., MD (11/10/17 08:01:37)                 Impression:        1. There has been interval placement of an NG tube. Its tip is projected over the inferior aspect of the T8 vertebral body. This is in the region of the distal one third of the esophagus.   2. There is a mild amount of interstitial and alveolar opacities seen in both lungs. There are Curly B-lines visualized bilaterally. This is characteristic of pulmonary edema.  3. An endotracheal tube remains in place.       Electronically signed by: DENIS BRAN MD  Date:     11/10/17  Time:    08:01              Narrative:    One-view chest x-ray    Clinical History: Respiratory distress    Finding: Comparison was made to a prior examination performed on 11/9/2017. An endotracheal tube remains in place. There has been interval placement of an NG tube. Its tip is projected over the inferior aspect of the T8 vertebral body. This is in the region of the distal one third of the esophagus. The size of the heart is normal. There is a mild amount of interstitial and alveolar opacities seen in both lungs. There are Curly B-lines visualized bilaterally. There is no pneumothorax.  The costophrenic angles are sharp.                             X-Ray Chest AP Portable (Final result)  Result time 11/09/17 13:36:44    Final result by Enrique Loera MD (11/09/17 13:36:44)                 Impression:       Widening of the upper mediastinum which correlates with CT findings of a dilated esophagus      Electronically signed by: ENRIQUE LOERA MD  Date:     11/09/17  Time:    13:36              Narrative:    Clinical Data:Preoperative    Comparison:  none    Findings:  Single view of the chest.   There is widening of the upper mediastinum which correlates with CT findings of a dilated esophagus with  ingested material throughout. Endotracheal tube appears satisfactory at the thoracic inlet.    Cardiac silhouette is normal.  The lungs demonstrate no evidence of active disease.  No evidence of pleural effusion or pneumothorax.  Bones appear intact. G-tube is seen in the upper abdomen.                                Assessment/Plan:      * Acute respiratory failure    Recurrent hospitalization for ingestion food and aspirating on it.  Intubated for respiratory distress in the field.  Consult pulmonary for vent management.  Likely can be extubated soon.            Severe protein-calorie malnutrition    BMI < 18  Nutrition consult.          Cerebral palsy    Resident of [facility].  Need to implement stricter precautions at the NH so patient cannot ingest oral food.          Aspiration into respiratory tract    Per report, large amount of oral secretions suctioned out.  Will continue coverage with broad spectrum IV antibiotics  De-escalate per pulmonology, appreciate recs  Follow up on cultures.  No evidence of pneumonia on CXR but is high risk as she did aspirate  WBC 19,000.              VTE Risk Mitigation         Ordered     enoxaparin injection 30 mg  Daily     Route:  Subcutaneous        11/09/17 2147     Medium Risk of VTE  Once      11/09/17 2111     Place LIANE hose  Until discontinued      11/09/17 2111     Place sequential compression device  Until discontinued      11/09/17 2111          Critical care time spent on the evaluation and treatment of severe organ dysfunction, review of pertinent labs and imaging studies, discussions with consulting providers and discussions with patient/family: 60 minutes.    Susan Yang MD  Department of Hospital Medicine   [facility]

## 2017-11-10 NOTE — ASSESSMENT & PLAN NOTE
Asymptomatic; sinus on 12 lead; not sedation related; monitor with prn atropine if falls below 30 or becomes symptomatic

## 2017-11-10 NOTE — SUBJECTIVE & OBJECTIVE
Review of Systems   Unable to perform ROS: Intubated     Objective:     Vital Signs (Most Recent):  Temp: 96.1 °F (35.6 °C) (11/10/17 0302)  Pulse: 69 (11/10/17 1044)  Resp: 16 (11/10/17 0831)  BP: 117/82 (11/10/17 0901)  SpO2: 100 % (11/10/17 1044) Vital Signs (24h Range):  Temp:  [96.1 °F (35.6 °C)-98.7 °F (37.1 °C)] 96.1 °F (35.6 °C)  Pulse:  [] 69  Resp:  [10-44] 16  SpO2:  [97 %-100 %] 100 %  BP: ()/() 117/82     Weight: 38.4 kg (84 lb 10.5 oz)  Body mass index is 16.53 kg/m².    Intake/Output Summary (Last 24 hours) at 11/10/17 1128  Last data filed at 11/10/17 0600   Gross per 24 hour   Intake          1896.36 ml   Output              875 ml   Net          1021.36 ml      Physical Exam   Constitutional: No distress. She is intubated.   Malnourished, cachectic, intubated   HENT:   Head: Normocephalic and atraumatic.   Eyes: Conjunctivae are normal. Pupils are equal, round, and reactive to light. No scleral icterus.   Cardiovascular: Normal rate, regular rhythm, normal heart sounds and intact distal pulses.    No murmur heard.  Pulmonary/Chest: Breath sounds normal. No accessory muscle usage. She is intubated.   Abdominal: Soft. She exhibits no distension.   Musculoskeletal: She exhibits no edema.   Lymphadenopathy:     She has no cervical adenopathy.   Neurological:   Intubated, sedated on propofol   Skin: Skin is warm. She is not diaphoretic. No erythema.   No skin breakdown noted   Nursing note and vitals reviewed.      Significant Labs:   Blood Culture: No results for input(s): LABBLOO in the last 48 hours.  BMP:   Recent Labs  Lab 11/10/17  1019   GLU 95      K 3.4*   *   CO2 21*   BUN 9   CREATININE 0.6   CALCIUM 8.7   MG 1.8     CBC:   Recent Labs  Lab 11/09/17  1410 11/10/17  1019   WBC 19.17* 6.76   HGB 13.3 9.9*   HCT 40.7 30.6*    209      Recent Labs  Lab 11/09/17  1600   LACTATE 0.7     Magnesium:   Recent Labs  Lab 11/10/17  1019   MG 1.8     Respiratory  Culture: No results for input(s): GSRESP, RESPIRATORYC in the last 48 hours.  Troponin:   Recent Labs  Lab 11/09/17  1410   TROPONINI <0.006     All pertinent labs within the past 24 hours have been reviewed.    Significant Imaging: I have reviewed and interpreted all pertinent imaging results/findings within the past 24 hours.     Imaging Results          X-Ray Chest 1 View (Final result)  Result time 11/10/17 08:01:37    Final result by DENIS Bran Sr., MD (11/10/17 08:01:37)                 Impression:        1. There has been interval placement of an NG tube. Its tip is projected over the inferior aspect of the T8 vertebral body. This is in the region of the distal one third of the esophagus.   2. There is a mild amount of interstitial and alveolar opacities seen in both lungs. There are Curly B-lines visualized bilaterally. This is characteristic of pulmonary edema.  3. An endotracheal tube remains in place.       Electronically signed by: DEINS BRAN MD  Date:     11/10/17  Time:    08:01              Narrative:    One-view chest x-ray    Clinical History: Respiratory distress    Finding: Comparison was made to a prior examination performed on 11/9/2017. An endotracheal tube remains in place. There has been interval placement of an NG tube. Its tip is projected over the inferior aspect of the T8 vertebral body. This is in the region of the distal one third of the esophagus. The size of the heart is normal. There is a mild amount of interstitial and alveolar opacities seen in both lungs. There are Curly B-lines visualized bilaterally. There is no pneumothorax.  The costophrenic angles are sharp.                             X-Ray Chest AP Portable (Final result)  Result time 11/09/17 13:36:44    Final result by Enrique Guzman MD (11/09/17 13:36:44)                 Impression:       Widening of the upper mediastinum which correlates with CT findings of a dilated esophagus      Electronically signed  by: INDIRA LOERA MD  Date:     11/09/17  Time:    13:36              Narrative:    Clinical Data:Preoperative    Comparison:  none    Findings:  Single view of the chest.   There is widening of the upper mediastinum which correlates with CT findings of a dilated esophagus with ingested material throughout. Endotracheal tube appears satisfactory at the thoracic inlet.    Cardiac silhouette is normal.  The lungs demonstrate no evidence of active disease.  No evidence of pleural effusion or pneumothorax.  Bones appear intact. G-tube is seen in the upper abdomen.

## 2017-11-10 NOTE — ASSESSMENT & PLAN NOTE
Asymptomatic and BP maintaining  Atropine prn at bedside if patient becomes symptomatic  Cardiac monitor

## 2017-11-10 NOTE — CONSULTS
[facility]   Gastroenterology  Consult Note    Patient Name: Belinda Urbina  MRN: [id number]  Admission Date: 11/9/2017  Hospital Length of Stay: 1 days  Code Status: Full Code   Attending Provider: Susan Yang MD   Consulting Provider: Marybel Tay PA-C  Primary Care Physician: Santhosh Mendiola MD  Principal Problem:Acute respiratory failure    Inpatient consult to Gastroenterology  Consult performed by: MARYBEL TAY  Consult ordered by: KYE SANTAMARIA  Reason for consult: Food bolus        Subjective:     HPI:  The patient presented to the ER for respiratory distress requiring intubation. She has a history of cerebral palsy and lives in a nursing facility. She has a history of esophageal stricture and it managed by PEG tube. However, the patient reportedly ate chips so there was concern for aspiration. A CT scan today showed a dilated esophagus with fluid and possible mass in the distal esophagus. PEG tube in good position. Moderate constipation. We have been consulted for possible EGD. Decrease in WBC count, BUN and Hgb likely from fluids received. The patient is noted to be bradycardic.     Past Medical History:   Diagnosis Date    Anxiety     Aphasia     Cerebral palsy     Deaf     Depression     GERD (gastroesophageal reflux disease)     Insomnia     Pressure ulcer     Seizures        History reviewed. No pertinent surgical history.    Review of patient's allergies indicates:  No Known Allergies  Family History     None        Social History Main Topics    Smoking status: Unknown If Ever Smoked    Smokeless tobacco: Not on file    Alcohol use Not on file      Comment: unable to assess    Drug use: Unknown    Sexual activity: Not on file     Review of Systems   Unable to perform ROS: Intubated     Objective:     Vital Signs (Most Recent):  Temp: 96.1 °F (35.6 °C) (11/10/17 0302)  Pulse: (!) 25 (11/10/17 1200)  Resp: 16 (11/10/17 1200)  BP: 117/82 (11/10/17  0901)  SpO2: (!) 33 % (11/10/17 1200) Vital Signs (24h Range):  Temp:  [96.1 °F (35.6 °C)-96.4 °F (35.8 °C)] 96.1 °F (35.6 °C)  Pulse:  [25-90] 25  Resp:  [15-22] 16  SpO2:  [33 %-100 %] 33 %  BP: ()/() 117/82     Weight: 38.4 kg (84 lb 10.5 oz) (11/10/17 1200)  Body mass index is 16.53 kg/m².      Intake/Output Summary (Last 24 hours) at 11/10/17 1502  Last data filed at 11/10/17 0600   Gross per 24 hour   Intake          1696.36 ml   Output              875 ml   Net           821.36 ml       Lines/Drains/Airways     Drain                 Gastrostomy/Enterostomy Percutaneous endoscopic gastrostomy (PEG) midline -- days         Urethral Catheter 11/09/17 1330 Latex 16 Fr. 1 day         NG/OG Tube 11/09/17 1810 orogastric 12 Fr. Right mouth less than 1 day          Airway                 Airway - Non-Surgical 11/09/17 1300 Endotracheal Tube 1 day          Peripheral Intravenous Line                 Peripheral IV - Single Lumen 11/09/17 1258 Right Antecubital 1 day                Physical Exam   Constitutional: She appears cachectic. She is sleeping.   HENT:   Head: Normocephalic and atraumatic.   Cardiovascular: Regular rhythm.  Bradycardia present.    Pulmonary/Chest: Breath sounds normal.   intubated   Abdominal: Soft. Bowel sounds are normal. She exhibits no distension.   Musculoskeletal: She exhibits no edema.   Neurological:   Sedated        Significant Labs:  CBC:   Recent Labs  Lab 11/09/17  1410 11/10/17  1019   WBC 19.17* 6.76   HGB 13.3 9.9*   HCT 40.7 30.6*    209     CMP:   Recent Labs  Lab 11/09/17  1410 11/10/17  1019    95   CALCIUM 8.8 8.7   ALBUMIN 3.3*  --    PROT 7.4  --     141   K 4.8 3.4*   CO2 20* 21*    113*   BUN 15 9   CREATININE 0.7 0.6   ALKPHOS 77  --    ALT 15  --    AST 30  --    BILITOT 0.5  --      Coagulation: No results for input(s): INR, APTT in the last 48 hours.    Invalid input(s): PT    Significant Imaging:  Imaging results within the past  24 hours have been reviewed.    Assessment/Plan:     * Acute respiratory failure    ICU team plans to leave her intubated and sedated until we do EGD tomorrow.         Esophageal obstruction due to food impaction    Patient has established esophageal stricture. There is a question of a mass vs food bolus.   She has been bradycardiac (HR in 30s) the last couple of hours, so we will re-evaluate her in the morning for possible EGD.   NPO.   Ok to use PEG tube today if needed, but no feedings after midnight.         Bradycardia    Etiology unclear. Management per ICU team.             Thank you for your consult. I will follow-up with patient. Please contact us if you have any additional questions.    Roque Tay PA-C  Gastroenterology  [facility]

## 2017-11-10 NOTE — ASSESSMENT & PLAN NOTE
Patient has established esophageal stricture. There is a question of a mass vs food bolus.   She has been bradycardiac (HR in 30s) the last couple of hours, so we will re-evaluate her in the morning for possible EGD.   NPO.   Ok to use PEG tube today if needed, but no feedings after midnight.

## 2017-11-10 NOTE — PROGRESS NOTES
"[facility] Hospital Medicine  Progress Note    Patient Name: Belinda Urbina  MRN: [id number]  Patient Class: IP- Inpatient   Admission Date: 11/9/2017  Length of Stay: 1 days  Attending Physician: Susan Yang MD  Primary Care Provider: Santhosh Mendiola MD        Subjective:     Principal Problem:Acute respiratory failure    HPI:  Ms. Urbina is an unfortunate 41 y/o AA female with h/o cerebral palsy, aphasic, deafness was transferred from [facility] ED, intubated. No family around, history obtained from chart review.    Patient is a resident of [facility], has a PEG in place but continues to somehow get her hands on food and eats them. Per chart review she is supposed to be NPO at all times. She had aspirated in the past month and went into respiratory failure, was emergently intubated and was taken to [facility]. She had EGD done at that time, and had large amount of retained food removed from the esophagus. Eventually discharged back to the nursing home. Per chart review, patient had similar episode again earlier today, placed food in her mouth and aspirated. She woas found to have lot of secretions when EMS arrived. She was intubated en route to [facility], and was diverted to [facility] ED. Transferred here to [facility] ICU for further monitoring. Currently she remains intubated on propofol. No family at bedside.      Per H&P done at [facility] on 10/20/2017:  "Ms. Urbina is a 41yo F w/ a PMHx of severe cerebral palsy (deaf, non-verbal), s/p PEG, seizure disorder (previously on dilantin, no anti-epileptics on list from nursing home), and behavioral issues (prior respiratory failure after choking on food/foreign body) who was transferred from [facility] following intubation for acute hypoxemic respiratory failure. Pt was noted to be tachypneic and in distress at nursing home. She has a history of placing food in mouth even though she's not supposed to have any oral intake, and therefore, NH was " "concerned that this happened. On arrival to [facility], pt was noted to have O2 sats in 50's - 90s which corrected readily with supplemental oxygen. Pt's ABG had a pH of 7.1, CO2 of 98, O2 of 58, and HCO3 of 31. Physicians there were unable to evaluate vocal cords without sedated exam, and therefore, pt was intubated. Upon intubation, it was noted that pt had a large amount of fruit as well as aspirated tube feeds around her vocal cords. At [facility], pt was noted to have a leukocytosis of 18; however, the remainder of her labs were unremarkable. She had a CTA completed which was negative for pulmonary embolism but did revealed a patchy consolidation in the left lower lobe as well as marked esophageal dilation with irregularity at the gastroesophageal junction (seen on prior imaging as well, prior scope revealing severe esophagitis and possible gastroparesis, biopsies positive for candida unclear if ever treated for this)".    EGD done 10/22/17 at [facility]:    Esophagus: Very narrowed stricture at distal esophagus. Severe distal esophagitis. White plaque in esophagus, possibly c/w candida. Large amount of fluid and retained food (peaches). Removed with multiple passes with Adam net.    Stomach:Not examined  Duodenum: Not examined  Complications: None    Impression:  1. Severe distal esophagitis with very narrowed distal esophageal stricture.  2. Food bolus status post removal.  3. Possible candidal esophagitis.                     Hospital Course:  11/10 - Intubated and sedated.  Responds to pain. Difficulty with access. ICU NP attempting to place midline under US guidance. ABG this morning 7.47/33.5/216/24.9. Satting 100% on ventilation. WBC 6.76<--19.         Review of Systems   Unable to perform ROS: Patient nonverbal (intubated and sedated)     Objective:     Vital Signs (Most Recent):  Temp: 96.1 °F (35.6 °C) (11/10/17 0302)  Pulse: (!) 25 (11/10/17 1200)  Resp: 16 (11/10/17 1200)  BP: 117/82 (11/10/17 0901)  SpO2: (!) " 33 % (11/10/17 1200) Vital Signs (24h Range):  Temp:  [96.1 °F (35.6 °C)-96.4 °F (35.8 °C)] 96.1 °F (35.6 °C)  Pulse:  [25-90] 25  Resp:  [15-22] 16  SpO2:  [33 %-100 %] 33 %  BP: ()/() 117/82     Weight: 38.4 kg (84 lb 10.5 oz)  Body mass index is 16.53 kg/m².    Intake/Output Summary (Last 24 hours) at 11/10/17 1546  Last data filed at 11/10/17 0600   Gross per 24 hour   Intake          1696.36 ml   Output              875 ml   Net           821.36 ml      Physical Exam   Constitutional: She appears cachectic. She is sleeping.   HENT:   Head: Normocephalic and atraumatic.   Cardiovascular: Regular rhythm.  Bradycardia present.    Pulmonary/Chest: Breath sounds normal.   intubated   Abdominal: Soft. Bowel sounds are normal. She exhibits no distension.   Musculoskeletal: She exhibits no edema.   Neurological:   Sedated        Significant Labs:   BMP:   Recent Labs  Lab 11/10/17  1019   GLU 95      K 3.4*   *   CO2 21*   BUN 9   CREATININE 0.6   CALCIUM 8.7   MG 1.8     CBC:   Recent Labs  Lab 11/09/17  1410 11/10/17  1019   WBC 19.17* 6.76   HGB 13.3 9.9*   HCT 40.7 30.6*    209     All pertinent labs within the past 24 hours have been reviewed.    Significant Imaging: I have reviewed and interpreted all pertinent imaging results/findings within the past 24 hours.    Assessment/Plan:      * Acute respiratory failure    Recurrent hospitalization for ingestion food and aspirating on it.  Intubated for respiratory distress in the field.  Pulmonary for vent management, appreciate recs              Seizure disorder    Monitor closely  Continue home phenytoin          Esophageal obstruction due to food impaction    EGD to remove aspirate when heart rate can tolerate          Bradycardia    Asymptomatic and BP maintaining  Atropine prn at bedside if patient becomes symptomatic  Cardiac monitor            Severe protein-calorie malnutrition    BMI < 18  Nutrition consult.          Cerebral  palsy    Resident of [facility].  Need to implement stricter precautions at the NH so patient cannot ingest oral food.          Aspiration into respiratory tract    Per report, large amount of oral secretions suctioned out.  Will continue coverage with broad spectrum IV antibiotics  De-escalate per pulmonology, appreciate recs  Follow up on cultures.  No evidence of pneumonia on CXR but is high risk as she did aspirate  WBC 19,000.              VTE Risk Mitigation         Ordered     enoxaparin injection 30 mg  Daily     Route:  Subcutaneous        11/09/17 2147     Medium Risk of VTE  Once      11/09/17 2111     Place sequential compression device  Until discontinued      11/09/17 2111          Critical care time spent on the evaluation and treatment of severe organ dysfunction, review of pertinent labs and imaging studies, discussions with consulting providers and discussions with patient/family: 60 minutes.    Susan Yang MD  Department of Hospital Medicine   [facility]

## 2017-11-10 NOTE — SUBJECTIVE & OBJECTIVE
Past Medical History:   Diagnosis Date    Anxiety     Aphasia     Cerebral palsy     Deaf     Depression     GERD (gastroesophageal reflux disease)     Insomnia     Pressure ulcer     Seizures        History reviewed. No pertinent surgical history.    Review of patient's allergies indicates:  No Known Allergies  Family History     None        Social History Main Topics    Smoking status: Unknown If Ever Smoked    Smokeless tobacco: Not on file    Alcohol use Not on file      Comment: unable to assess    Drug use: Unknown    Sexual activity: Not on file     Review of Systems   Unable to perform ROS: Intubated     Objective:     Vital Signs (Most Recent):  Temp: 96.1 °F (35.6 °C) (11/10/17 0302)  Pulse: (!) 25 (11/10/17 1200)  Resp: 16 (11/10/17 1200)  BP: 117/82 (11/10/17 0901)  SpO2: (!) 33 % (11/10/17 1200) Vital Signs (24h Range):  Temp:  [96.1 °F (35.6 °C)-96.4 °F (35.8 °C)] 96.1 °F (35.6 °C)  Pulse:  [25-90] 25  Resp:  [15-22] 16  SpO2:  [33 %-100 %] 33 %  BP: ()/() 117/82     Weight: 38.4 kg (84 lb 10.5 oz) (11/10/17 1200)  Body mass index is 16.53 kg/m².      Intake/Output Summary (Last 24 hours) at 11/10/17 1502  Last data filed at 11/10/17 0600   Gross per 24 hour   Intake          1696.36 ml   Output              875 ml   Net           821.36 ml       Lines/Drains/Airways     Drain                 Gastrostomy/Enterostomy Percutaneous endoscopic gastrostomy (PEG) midline -- days         Urethral Catheter 11/09/17 1330 Latex 16 Fr. 1 day         NG/OG Tube 11/09/17 1810 orogastric 12 Fr. Right mouth less than 1 day          Airway                 Airway - Non-Surgical 11/09/17 1300 Endotracheal Tube 1 day          Peripheral Intravenous Line                 Peripheral IV - Single Lumen 11/09/17 1258 Right Antecubital 1 day                Physical Exam   Constitutional: She appears cachectic. She is sleeping.   HENT:   Head: Normocephalic and atraumatic.   Cardiovascular: Regular  rhythm.  Bradycardia present.    Pulmonary/Chest: Breath sounds normal.   intubated   Abdominal: Soft. Bowel sounds are normal. She exhibits no distension.   Musculoskeletal: She exhibits no edema.   Neurological:   Sedated        Significant Labs:  CBC:   Recent Labs  Lab 11/09/17  1410 11/10/17  1019   WBC 19.17* 6.76   HGB 13.3 9.9*   HCT 40.7 30.6*    209     CMP:   Recent Labs  Lab 11/09/17  1410 11/10/17  1019    95   CALCIUM 8.8 8.7   ALBUMIN 3.3*  --    PROT 7.4  --     141   K 4.8 3.4*   CO2 20* 21*    113*   BUN 15 9   CREATININE 0.7 0.6   ALKPHOS 77  --    ALT 15  --    AST 30  --    BILITOT 0.5  --      Coagulation: No results for input(s): INR, APTT in the last 48 hours.    Invalid input(s): PT    Significant Imaging:  Imaging results within the past 24 hours have been reviewed.

## 2017-11-10 NOTE — ASSESSMENT & PLAN NOTE
Recurrent hospitalization for ingestion food and aspirating on it.  Intubated for respiratory distress in the field.  Consult pulmonary for vent management.  Likely can be extubated soon.

## 2017-11-10 NOTE — ASSESSMENT & PLAN NOTE
"GI consulted to [facility] for EGD; known stricture pt has PEG for nutrition and meds but continues to "steal" food from other NH residents due to mental handicap  "

## 2017-11-10 NOTE — CONSULTS
[facility]   Critical Care Medicine  Consult Note    Patient Name: Belinda Urbina  MRN: [id number]  Admission Date: 11/9/2017  Hospital Length of Stay: 1 days  Code Status: Full Code  Attending Physician: Susan Yang MD   Primary Care Provider: Santhosh Mendiola MD   Principal Problem: Acute respiratory failure      Subjective:     HPI:  42 year old female nursing home resident with PMH severe cerebral palsy, deaf, non-verbal, seizure disorder (not currently on antiepileptics), and  esophageal stricture with history of severe obstructing food bolus and aspiration; she has PEG for nutrition and meds however if food gets within her grasp she is not capable of comprehending the need for NPO status and continues to attempt to eat  She presented to [facility] ED with respiratory distress; she was intubated in ED s/t impending respiratory arrest with poor effort and unable to clear oral secretions  Further evaluation revealed leukocytosis along with CXR showing wide mediastinum with clear lung fields    Hospital/ICU Course:  Transferred to ICU with OETT on mechanical ventilation and propofol sedation    Past Medical History:   Diagnosis Date    Anxiety     Aphasia     Cerebral palsy     Deaf     Depression     GERD (gastroesophageal reflux disease)     Insomnia     Pressure ulcer     Seizures        History reviewed. No pertinent surgical history.    Review of patient's allergies indicates:  No Known Allergies    Family History     None        Social History Main Topics    Smoking status: Unknown If Ever Smoked    Smokeless tobacco: Not on file    Alcohol use Not on file      Comment: unable to assess    Drug use: Unknown    Sexual activity: Not on file         Review of Systems   Unable to perform ROS: Patient nonverbal (intubated and sedated)     Objective:     Vital Signs (Most Recent):  Temp: 96.1 °F (35.6 °C) (11/10/17 0302)  Pulse: (!) 25 (11/10/17 1200)  Resp: 16 (11/10/17  1200)  BP: 117/82 (11/10/17 0901)  SpO2: (!) 33 % (11/10/17 1200) Vital Signs (24h Range):  Temp:  [96.1 °F (35.6 °C)-96.4 °F (35.8 °C)] 96.1 °F (35.6 °C)  Pulse:  [] 25  Resp:  [15-22] 16  SpO2:  [33 %-100 %] 33 %  BP: ()/() 117/82     Weight: 38.4 kg (84 lb 10.5 oz)  Body mass index is 16.53 kg/m².      Intake/Output Summary (Last 24 hours) at 11/10/17 1346  Last data filed at 11/10/17 0600   Gross per 24 hour   Intake          1896.36 ml   Output              875 ml   Net          1021.36 ml       Physical Exam   Constitutional: She appears cachectic. She has a sickly appearance. She is intubated and restrained.   HENT:   Head: Atraumatic.   Eyes: Conjunctivae are normal.   Neck: No JVD present. No tracheal deviation present.   Cardiovascular: Regular rhythm.   No extrasystoles are present. Bradycardia present.    No murmur heard.  Pulses:       Radial pulses are 1+ on the right side, and 1+ on the left side.        Dorsalis pedis pulses are 1+ on the right side, and 1+ on the left side.   Pulmonary/Chest: She is intubated. No respiratory distress. She has decreased breath sounds. She has no wheezes. She has no rales.   Abdominal: Soft. She exhibits no distension. Bowel sounds are decreased. There is no tenderness.       Musculoskeletal: She exhibits no edema.   Neurological: GCS eye subscore is 2. GCS verbal subscore is 1. GCS motor subscore is 5.   Pt is deaf and non verbal at baseline; sedated on propofol   Skin: Skin is dry. Capillary refill takes 2 to 3 seconds. No cyanosis.   Cool  No skin lesions/breakdown       Vents:  Vent Mode: A/C (11/10/17 1200)  Ventilator Initiated: Yes (11/09/17 1310)  Set Rate: 16 bmp (11/10/17 1200)  Vt Set: (S) 350 mL (11/10/17 1200)  Pressure Support: 0 cmH20 (11/10/17 1200)  PEEP/CPAP: 3 cmH20 (11/10/17 1200)  Oxygen Concentration (%): 35 (11/10/17 1200)  Peak Airway Pressure: 17 cmH2O (11/10/17 1200)  Plateau Pressure: 0 cmH20 (11/10/17 1200)  Total Ve: 6.48  mL (11/10/17 1200)  F/VT Ratio<105 (RSBI): (!) 74.42 (11/10/17 1200)    Lines/Drains/Airways     Drain                 Gastrostomy/Enterostomy Percutaneous endoscopic gastrostomy (PEG) midline -- days         Urethral Catheter 11/09/17 1330 Latex 16 Fr. 1 day         NG/OG Tube 11/09/17 1810 orogastric 12 Fr. Right mouth less than 1 day          Airway                 Airway - Non-Surgical 11/09/17 1300 Endotracheal Tube 1 day          Peripheral Intravenous Line                 Peripheral IV - Single Lumen 11/09/17 1258 Right Antecubital 1 day                Significant Labs:    CBC/Anemia Profile:    Recent Labs  Lab 11/09/17  1410 11/10/17  1019   WBC 19.17* 6.76   HGB 13.3 9.9*   HCT 40.7 30.6*    209   MCV 78* 78*   RDW 16.3* 15.8*        Chemistries:    Recent Labs  Lab 11/09/17  1410 11/10/17  1019    141   K 4.8 3.4*    113*   CO2 20* 21*   BUN 15 9   CREATININE 0.7 0.6   CALCIUM 8.8 8.7   ALBUMIN 3.3*  --    PROT 7.4  --    BILITOT 0.5  --    ALKPHOS 77  --    ALT 15  --    AST 30  --    MG  --  1.8   PHOS  --  2.1*       All pertinent labs within the past 24 hours have been reviewed.  ABG    Recent Labs  Lab 11/10/17  1154   PH 7.317*   PO2 157*   PCO2 40.2   HCO3 20.6*   BE -6         Significant Imaging:   I have reviewed all pertinent imaging results/findings within the past 24 hours.    Assessment/Plan:     Neuro    Propofol for vent sedation, RASS goal -2     Seizure disorder - resume dilantin   Cerebral palsy    Hold geodon, trazadone, zyprexa while on propofol/vent        Psychiatric    Hold home benztropine while on propofol        Pulmonary   * Acute respiratory failure    Full vent support until food bolus/EGD per GI; settings reviewed and adjusted per abg; VAP prophylaxis        Aspiration into respiratory tract    Suspect food bolus accumulation without aspiration into resp tract; normal wbc, temp; sepsis surveillance and monitor chest film        Cardiac/Vascular  "  Bradycardia    Asymptomatic; sinus on 12 lead; not sedation related; monitor with prn atropine if falls below 30 or becomes symptomatic        Renal/    Accurate I/O; monitor creatinine        ID    Sepsis surveillance; will stop abx        Endocrine   Severe protein-calorie malnutrition    NPO until eval by GI then TF per RD recs to meet caloric goals        GI   Esophageal obstruction due to food impaction    GI consulted to eval for EGD; known stricture pt has PEG for nutrition and meds but continues to "steal" food from other NH residents due to mental handicap            Critical Care Daily Checklist:    A: Awake: RASS Goal/Actual Goal: RASS Goal: -1-->drowsy  Actual: [place] Agitation Sedation Scale (RASS): Drowsy   B: Spontaneous Breathing Trial Performed?  no; EGD for food bolus pending   C: SAT & SBT Coordinated?  SAT tolerated; no SBT until EGD                      D: Delirium: CAM-ICU     E: Early Mobility Performed? Yes; ROM   F: Feeding Goal: Goals: 1. Initiate nutrition within 72 hrs. 2. Meet 85- 100 % of estimated nutritional needs without s/s of GI distress   Status: Nutrition Goal Status: new   Current Diet Order   Procedures    Diet NPO      AS: Analgesia/Sedation Propofol sedation   T: Thromboembolic Prophylaxis LMWH   H: HOB > 300 Yes   U: Stress Ulcer Prophylaxis (if needed) PPI   G: Glucose Control 24 hour glucose range    B: Bowel Function  no BM charted; add prn dulcolax until taking by mouth   I: Indwelling Catheter (Lines & Gonzalez) Necessity Gonzalez day 1  OETT day 1  PEG present on admission   D: De-escalation of Antimicrobials/Pharmacotherapies Stop empiric abx; monitor temp, wbc, culture data    Plan for the day/ETD EGD to clear food bolus; monitoring; Supportive care    Code Status:  Family/Goals of Care: Full Code  Return to NH on discharge     Critical Care Time: 71 minutes  Critical secondary to Patient has a condition that poses threat to life and bodily function: Acute " Respiratory Failure  Critical care was time spent personally by me on the following activities: development of treatment plan with patient or surrogate and bedside caregivers, discussions with consultants, evaluation of patient's response to treatment, examination of patient, ordering and performing treatments and interventions, ordering and review of laboratory studies, ordering and review of radiographic studies, pulse oximetry, re-evaluation of patient's condition. This critical care time did not overlap with that of any other provider or involve time for any procedures.     Jocelyne Duggan NP  Critical Care Medicine  [facility]

## 2017-11-10 NOTE — SUBJECTIVE & OBJECTIVE
Review of Systems   Unable to perform ROS: Patient nonverbal (intubated and sedated)     Objective:     Vital Signs (Most Recent):  Temp: 96.1 °F (35.6 °C) (11/10/17 0302)  Pulse: (!) 25 (11/10/17 1200)  Resp: 16 (11/10/17 1200)  BP: 117/82 (11/10/17 0901)  SpO2: (!) 33 % (11/10/17 1200) Vital Signs (24h Range):  Temp:  [96.1 °F (35.6 °C)-96.4 °F (35.8 °C)] 96.1 °F (35.6 °C)  Pulse:  [25-90] 25  Resp:  [15-22] 16  SpO2:  [33 %-100 %] 33 %  BP: ()/() 117/82     Weight: 38.4 kg (84 lb 10.5 oz)  Body mass index is 16.53 kg/m².    Intake/Output Summary (Last 24 hours) at 11/10/17 1546  Last data filed at 11/10/17 0600   Gross per 24 hour   Intake          1696.36 ml   Output              875 ml   Net           821.36 ml      Physical Exam   Constitutional: She appears cachectic. She is sleeping.   HENT:   Head: Normocephalic and atraumatic.   Cardiovascular: Regular rhythm.  Bradycardia present.    Pulmonary/Chest: Breath sounds normal.   intubated   Abdominal: Soft. Bowel sounds are normal. She exhibits no distension.   Musculoskeletal: She exhibits no edema.   Neurological:   Sedated        Significant Labs:   BMP:   Recent Labs  Lab 11/10/17  1019   GLU 95      K 3.4*   *   CO2 21*   BUN 9   CREATININE 0.6   CALCIUM 8.7   MG 1.8     CBC:   Recent Labs  Lab 11/09/17  1410 11/10/17  1019   WBC 19.17* 6.76   HGB 13.3 9.9*   HCT 40.7 30.6*    209     All pertinent labs within the past 24 hours have been reviewed.    Significant Imaging: I have reviewed and interpreted all pertinent imaging results/findings within the past 24 hours.

## 2017-11-10 NOTE — ASSESSMENT & PLAN NOTE
Per report, large amount of oral secretions suctioned out.  Will continue coverage with broad spectrum IV antibiotics  De-escalate per pulmonology, appreciate recs  Follow up on cultures.  No evidence of pneumonia on CXR but is high risk as she did aspirate  WBC 19,000.

## 2017-11-10 NOTE — ASSESSMENT & PLAN NOTE
Suspect food bolus accumulation without aspiration into resp tract; normal wbc, temp; sepsis surveillance and monitor chest film

## 2017-11-10 NOTE — H&P
"[facility] Hospital Medicine  History & Physical    Patient Name: Belinda Urbina  MRN: [id number]  Admission Date: 11/9/2017  Attending Physician: Roberto Cervantes MD  Primary Care Provider: Santhosh Mendiola MD         Patient information was obtained from past medical records and ER records.     Subjective:     Principal Problem:Acute respiratory failure    Chief Complaint:   Chief Complaint   Patient presents with    Respiratory Arrest     resp arrest per [facility]        HPI: Ms. Urbina is an unfortunate 41 y/o AA female with h/o cerebral palsy, aphasic, deafness was transferred from [facility] ED, intubated. No family around, history obtained from chart review.    Patient is a resident of [facility], has a PEG in place but continues to somehow get her hands on food and eats them. Per chart review she is supposed to be NPO at all times. She had aspirated in the past month and went into respiratory failure, was emergently intubated and was taken to [facility]. She had EGD done at that time, and had large amount of retained food removed from the esophagus. Eventually discharged back to the nursing home. Per chart review, patient had similar episode again earlier today, placed food in her mouth and aspirated. She woas found to have lot of secretions when EMS arrived. She was intubated en route to [facility], and was diverted to [facility] ED. Transferred here to [facility] for further monitoring. Currently she remains intubated on propofol. No family at bedside.      Per H&P done at [facility] on 10/20/2017:  "Ms. Urbina is a 43yo F w/ a PMHx of severe cerebral palsy (deaf, non-verbal), s/p PEG, seizure disorder (previously on dilantin, no anti-epileptics on list from nursing home), and behavioral issues (prior respiratory failure after choking on food/foreign body) who was transferred from [facility] following intubation for acute hypoxemic respiratory failure. Pt was noted to be tachypneic and in distress at " "nursing home. She has a history of placing food in mouth even though she's not supposed to have any oral intake, and therefore, NH was concerned that this happened. On arrival to [facility], pt was noted to have O2 sats in 50's - 90s which corrected readily with supplemental oxygen. Pt's ABG had a pH of 7.1, CO2 of 98, O2 of 58, and HCO3 of 31. Physicians there were unable to evaluate vocal cords without sedated exam, and therefore, pt was intubated. Upon intubation, it was noted that pt had a large amount of fruit as well as aspirated tube feeds around her vocal cords. At [facility], pt was noted to have a leukocytosis of 18; however, the remainder of her labs were unremarkable. She had a CTA completed which was negative for pulmonary embolism but did revealed a patchy consolidation in the left lower lobe as well as marked esophageal dilation with irregularity at the gastroesophageal junction (seen on prior imaging as well, prior scope revealing severe esophagitis and possible gastroparesis, biopsies positive for candida unclear if ever treated for this)".    EGD done 10/22/17 at [facility]:    Esophagus: Very narrowed stricture at distal esophagus. Severe distal esophagitis. White plaque in esophagus, possibly c/w candida. Large amount of fluid and retained food (peaches). Removed with multiple passes with Adam net.    Stomach:Not examined  Duodenum: Not examined  Complications: None    Impression:  1. Severe distal esophagitis with very narrowed distal esophageal stricture.  2. Food bolus status post removal.  3. Possible candidal esophagitis.                     Past Medical History:   Diagnosis Date    Anxiety     Aphasia     Cerebral palsy     Deaf     Depression     GERD (gastroesophageal reflux disease)     Insomnia     Pressure ulcer     Seizures        History reviewed. No pertinent surgical history.    Review of patient's allergies indicates:  No Known Allergies    No current facility-administered " medications on file prior to encounter.      Current Outpatient Prescriptions on File Prior to Encounter   Medication Sig    fluphenazine (PROLIXIN) 10 MG tablet 10 mg by Per G Tube route once daily.     lorazepam (ATIVAN) 1 MG tablet 1 mg by Per G Tube route every 6 (six) hours as needed for Anxiety.     olanzapine (ZYPREXA) 10 MG tablet 15 mg by Per G Tube route every evening.     trazodone (DESYREL) 100 MG tablet 100 mg by Per G Tube route every evening.     valsartan (DIOVAN) 80 MG tablet 80 mg by Per G Tube route once daily.     ziprasidone (GEODON) 20 MG Cap Take 20 mg by mouth 2 (two) times daily.    zolpidem (AMBIEN CR) 12.5 MG CR tablet Take 12.5 mg by mouth nightly as needed for Insomnia.     Family History     H/o cerebral palsy, currently intubated and sedated, no family at bedside to obtain FH.        Social History Main Topics    Smoking status: Unknown If Ever Smoked    Smokeless tobacco: Not on file    Alcohol use Not on file      Comment: unable to assess    Drug use: Unknown    Sexual activity: Not on file     Review of Systems   Unable to perform ROS: Intubated     Objective:     Vital Signs (Most Recent):  Temp: 98.7 °F (37.1 °C) (11/09/17 1346)  Pulse: 78 (11/09/17 2035)  Resp: 17 (11/09/17 2035)  BP: 118/77 (11/09/17 2035)  SpO2: 100 % (11/09/17 2035) Vital Signs (24h Range):  Temp:  [98.6 °F (37 °C)-98.7 °F (37.1 °C)] 98.7 °F (37.1 °C)  Pulse:  [] 78  Resp:  [10-44] 17  SpO2:  [100 %] 100 %  BP: (105-142)/() 118/77     Weight: 36.3 kg (80 lb)  There is no height or weight on file to calculate BMI.    Physical Exam   Constitutional: No distress. She is intubated.   Malnourished, cachectic, intubated   HENT:   Head: Normocephalic and atraumatic.   Eyes: Conjunctivae are normal. Pupils are equal, round, and reactive to light. No scleral icterus.   Cardiovascular: Normal rate, regular rhythm, normal heart sounds and intact distal pulses.    No murmur  heard.  Pulmonary/Chest: Breath sounds normal. No accessory muscle usage. She is intubated.   Abdominal: Soft. She exhibits no distension.   Musculoskeletal: She exhibits no edema.   Lymphadenopathy:     She has no cervical adenopathy.   Neurological:   Intubated, sedated on propofol   Skin: Skin is warm. She is not diaphoretic. No erythema.   No skin breakdown noted   Nursing note and vitals reviewed.       Significant Labs:   ABGs:   Recent Labs  Lab 11/09/17  1425   PH 7.427   PCO2 42.4   HCO3 28.0   POCSATURATED 100   BE 4     Bilirubin:   Recent Labs  Lab 10/19/17  2030 11/09/17  1410   BILITOT <0.1* 0.5     BMP:   Recent Labs  Lab 11/09/17  1410         K 4.8      CO2 20*   BUN 15   CREATININE 0.7   CALCIUM 8.8     CBC:   Recent Labs  Lab 11/09/17  1410   WBC 19.17*   HGB 13.3   HCT 40.7        CMP:   Recent Labs  Lab 11/09/17  1410      K 4.8      CO2 20*      BUN 15   CREATININE 0.7   CALCIUM 8.8   PROT 7.4   ALBUMIN 3.3*   BILITOT 0.5   ALKPHOS 77   AST 30   ALT 15   ANIONGAP 13   EGFRNONAA >60.0     Cardiac Markers:   Recent Labs  Lab 11/09/17  1410   BNP 23     Troponin:   Recent Labs  Lab 11/09/17  1410   TROPONINI <0.006     TSH: No results for input(s): TSH in the last 4320 hours.  Urine Studies:   Recent Labs  Lab 11/09/17  1605   COLORU Yellow   APPEARANCEUA Clear   PHUR 7.0   SPECGRAV 1.025   PROTEINUA Trace*   GLUCUA Negative   KETONESU Negative   BILIRUBINUA Negative   OCCULTUA Negative   NITRITE Negative   UROBILINOGEN Negative   LEUKOCYTESUR Negative     All pertinent labs within the past 24 hours have been reviewed.    Significant Imaging: I have reviewed and interpreted all pertinent imaging results/findings within the past 24 hours.     Imaging Results          X-Ray Chest AP Portable (Final result)  Result time 11/09/17 13:36:44    Final result by Enrique Guzman MD (11/09/17 13:36:44)                 Impression:       Widening of the upper  mediastinum which correlates with CT findings of a dilated esophagus      Electronically signed by: INDIRA LOERA MD  Date:     11/09/17  Time:    13:36              Narrative:    Clinical Data:Preoperative    Comparison:  none    Findings:  Single view of the chest.   There is widening of the upper mediastinum which correlates with CT findings of a dilated esophagus with ingested material throughout. Endotracheal tube appears satisfactory at the thoracic inlet.    Cardiac silhouette is normal.  The lungs demonstrate no evidence of active disease.  No evidence of pleural effusion or pneumothorax.  Bones appear intact. G-tube is seen in the upper abdomen.                              I have independently reviewed and interpreted the EKG. My findings include NSR.    I have independently reviewed all pertinent labs within the past 24 hours.    I have independently reviewed, visualized and interpreted all pertinent imaging results within the past 24 hours and discussed the findings with the ED physician.            Assessment/Plan:     * Acute respiratory failure    Recurrent hospitalization for ingestion food and aspirating on it.  Intubated for respiratory distress in the field.  Consult pulmonary for vent management.  Likely can be extubated soon.            Aspiration into respiratory tract    Per report, large amount of oral secretions suctioned out.  Will empirically cover with broad spectrum IV antibiotics tonight.  De-escalate in AM as necessary.  Follow up on cultures.  No evidence of pneumonia of CXR.  WBC 19,000.            Cerebral palsy    Resident of [facility].  Need to implement stricter precautions at the NH so patient cannot ingest oral food.          Severe protein-calorie malnutrition    BMI < 18  Nutrition consult.            VTE Risk Mitigation         Ordered     enoxaparin injection 30 mg  Daily     Route:  Subcutaneous        11/09/17 3391     Medium Risk of VTE  Once       11/09/17 2111     Place LIANE hose  Until discontinued      11/09/17 2111     Place sequential compression device  Until discontinued      11/09/17 2111        Critical care time spent on the evaluation and treatment of severe organ dysfunction, review of pertinent labs and imaging studies, discussions with consulting providers and discussions with patient/family: 40 minutes.     Roberto Cervantes MD  Department of Hospital Medicine   [facility]

## 2017-11-10 NOTE — PROGRESS NOTES
Pt arrived @ ED via [facility], intubated and on vent. Transfer pt from [facility]. Pt placed on vent and tolerating well. Alarms on and functioning and Ambu @ HOB. Will continue to monitor.

## 2017-11-10 NOTE — ASSESSMENT & PLAN NOTE
Recurrent hospitalization for ingestion food and aspirating on it.  Intubated for respiratory distress in the field.  Pulmonary for vent management, angely agarwal

## 2017-11-10 NOTE — ASSESSMENT & PLAN NOTE
Nutrition Problem  Inadequate energy intake    Related to (etiology):   Intubation     Signs and Symptoms (as evidenced by):    NPO, no alternative means of nutrition.     Interventions/Recommendations (treatment strategy):  See RD recs above    Nutrition Diagnosis Status:   New

## 2017-11-10 NOTE — HPI
"Ms. Urbina is an unfortunate 43 y/o AA female with h/o cerebral palsy, aphasic, deafness was transferred from [facility] ED, intubated. No family around, history obtained from chart review.    Patient is a resident of [facility], has a PEG in place but continues to somehow get her hands on food and eats them. Per chart review she is supposed to be NPO at all times. She had aspirated in the past month and went into respiratory failure, was emergently intubated and was taken to [facility]. She had EGD done at that time, and had large amount of retained food removed from the esophagus. Eventually discharged back to the nursing home. Per chart review, patient had similar episode again earlier today, placed food in her mouth and aspirated. She woas found to have lot of secretions when EMS arrived. She was intubated en route to [facility], and was diverted to [facility] ED. Transferred here to [facility] ICU for further monitoring. Currently she remains intubated on propofol. No family at bedside.      Per H&P done at [facility] on 10/20/2017:  "Ms. Urbina is a 43yo F w/ a PMHx of severe cerebral palsy (deaf, non-verbal), s/p PEG, seizure disorder (previously on dilantin, no anti-epileptics on list from nursing home), and behavioral issues (prior respiratory failure after choking on food/foreign body) who was transferred from [facility] following intubation for acute hypoxemic respiratory failure. Pt was noted to be tachypneic and in distress at nursing home. She has a history of placing food in mouth even though she's not supposed to have any oral intake, and therefore, NH was concerned that this happened. On arrival to [facility], pt was noted to have O2 sats in 50's - 90s which corrected readily with supplemental oxygen. Pt's ABG had a pH of 7.1, CO2 of 98, O2 of 58, and HCO3 of 31. Physicians there were unable to evaluate vocal cords without sedated exam, and therefore, pt was intubated. Upon intubation, it was noted that pt had a " "large amount of fruit as well as aspirated tube feeds around her vocal cords. At [facility], pt was noted to have a leukocytosis of 18; however, the remainder of her labs were unremarkable. She had a CTA completed which was negative for pulmonary embolism but did revealed a patchy consolidation in the left lower lobe as well as marked esophageal dilation with irregularity at the gastroesophageal junction (seen on prior imaging as well, prior scope revealing severe esophagitis and possible gastroparesis, biopsies positive for candida unclear if ever treated for this)".    EGD done 10/22/17 at [facility]:    Esophagus: Very narrowed stricture at distal esophagus. Severe distal esophagitis. White plaque in esophagus, possibly c/w candida. Large amount of fluid and retained food (peaches). Removed with multiple passes with Adam net.    Stomach:Not examined  Duodenum: Not examined  Complications: None    Impression:  1. Severe distal esophagitis with very narrowed distal esophageal stricture.  2. Food bolus status post removal.  3. Possible candidal esophagitis.   "

## 2017-11-10 NOTE — PLAN OF CARE
Problem: Patient Care Overview  Goal: Plan of Care Review  Outcome: Ongoing (interventions implemented as appropriate)  Patient intubated and sedated on Propofol. Rass Goal of -1. Responds to pain. Makes eye contact with movement.  Patient is deaf.  Will not follow commands.   Vitals stable.  No s/s of pain.  IV fluids maintained.  IV abx administered per orders. No s/s of cardiac distress.  Patient is NPO peg tube in place as well as OG tube.  Adequate urine output noted.  Restraints in place.  Patient is contracted.  Repositioning patient Q2H to prevent skin  Breakdown.  Bed alarm activated.  No falls on shift.  McLeod encouraged.  No family has been present to discuss POC.

## 2017-11-10 NOTE — PLAN OF CARE
Problem: Patient Care Overview  Goal: Plan of Care Review  Outcome: Ongoing (interventions implemented as appropriate)  Recommendations     Recommendation/Intervention:   1.If unable to extubate, consider Nutren 1.5 at 25 ml/hr with 1 pack of beneprotein, with flushes as needed per MD or NP for hydration. With current propofol infusion, provides 1125 calories, 47 g protein, and 458 ml water.   2.When Pt extubated, consider Isosource 1.5 bolus 250 ml every 6 hours (or 4 cans daily total volume) with 50 ml flush before and after each can plus additional water flushes of 150 ml BID for hydration or as needed per MD (1500 kcal, 68 g protein, 1464 ml water).    3. Hold for residuals >500 mL 4. Will continue to monitor.      Goals: 1. Initiate nutrition within 72 hrs. 2. Meet 85- 100 % of estimated nutritional needs without s/s of GI distress   Nutrition Goal Status: new  Communication of RD Recs:  (care plan and sticky note)

## 2017-11-10 NOTE — PROGRESS NOTES
"Wound Care Screen completed due to documented Gomez Scale score <=14.  Skin assessment completed at this time. Skin intact.  PEG tube to LUQ noted with herniation of peg track noted extending 1cm out, tissue appears yellow adipose tissue.  Covered with vaseline gauze and gauze.    Spoke with primary nurse and discussed importance of documenting the indicated / performed skin interventions in EPIC flow sheet (ie: turning q 2 hours with actual position documented, heels floated, skin moisturizer utilized, all incontinence care, mattress surface, incontinence pad utilized, moisture barriers utilized, preventive dressings, etc.). Nurse verbalizes understanding.  Wound care recommends the following for Pressure Injury Prevention:  Skin Care Precautions / Pressure Injury Prevention:  1. Follow "Guidelines for Prevention of Pressure Ulcers in At Risk Patients"  These guidelines can be found on the [facility] Intranet by searching "Wound Care / Ostomy Resources"  2. Document wound assessment in [facility] using guidelines in Tim's "Assessment : Wound" procedure  3. Limit the amount of linen/underpad between patient and mattress surface to ONE fitted sheet and ONE covidien underpad - NO draw sheet/briefs.  4. Obtain Easi Cleans Foam Wipes for providing pritesh care - avoid the use of wash cloths to areas affected by IAD.  5. Apply Clear Barrier Ointment to perineal / perirectal areas in a thin even layer to clean dry skin BID and after each episode of pericare  6. Apply sween 24 moisturizer cream to all dry skin after daily bath and prn  7. Obtain foam wedge from materials management to assist with maintaining proper position changes at least q 2hours and document actual position in EPIC q 2hours  8. Elevate heels off mattress on 2 separate pillows placed lengthwise under each leg supporting the leg from knee to ankle.  Document in EPIC flow sheet every 2 hours.  9. .Do NOT elevate HOB greater than 30 degrees unless " contraindicated.  10. Remove SCD/Plexi Pulses/LIANE's every 12 hours for 30 minutes and assess skin underneath these devices for breakdown

## 2017-11-10 NOTE — PLAN OF CARE
"D/C planning initiated. Patient is presently intubated. EDITH called [facility] and left a message for the BILL Anderson to return my call.  An optimal d/c plan for the patient , will be to return to 24 hr care at the NH. CM to f/u for safe transition.  1450 - Received call from Monica [facility]. She stated the patient came to them from a group after [facility] admission because they refused to take her back.  They accepted her as NH resident after that hospitalization.  " she is quick and has to be watched very closely". Edith gave her an update on patient's care plan.  CM to f/u with safe transition.     11/10/17 0850   Discharge Assessment   Assessment Type Discharge Planning Assessment   Confirmed/corrected address and phone number on facesheet? No   Assessment information obtained from? Medical Record   Expected Length of Stay (days) (TBD)   Communicated expected length of stay with patient/caregiver no   Prior to hospitilization cognitive status: Unable to Assess   Current cognitive status: Unable to Assess   Current Functional Status: Completely Dependent   Facility Arrived From: ([facility])   Lives With other (see comments)   Able to Return to Prior Arrangements yes   Is patient able to care for self after discharge? No   Who are your caregiver(s) and their phone number(s)? ([facility])   Patient's perception of discharge disposition nursing home   Readmission Within The Last 30 Days no previous admission in last 30 days   Patient currently being followed by outpatient case management? No   Patient currently receives any other outside agency services? No   Equipment Currently Used at Home other (see comments)   Is the patient taking medications as prescribed? yes   Does the patient have transportation home? Yes   Transportation Available agency transportation   Does the patient receive services at [facility]? No   Discharge Plan A Return to nursing home   Discharge Plan B Return to Nursing " Home   Patient/Family In Agreement With Plan unable to assess

## 2017-11-10 NOTE — ASSESSMENT & PLAN NOTE
Resident of [facility].  Need to implement stricter precautions at the NH so patient cannot ingest oral food.

## 2017-11-10 NOTE — SUBJECTIVE & OBJECTIVE
Past Medical History:   Diagnosis Date    Anxiety     Aphasia     Cerebral palsy     Deaf     Depression     GERD (gastroesophageal reflux disease)     Insomnia     Pressure ulcer     Seizures        History reviewed. No pertinent surgical history.    Review of patient's allergies indicates:  No Known Allergies    Family History     None        Social History Main Topics    Smoking status: Unknown If Ever Smoked    Smokeless tobacco: Not on file    Alcohol use Not on file      Comment: unable to assess    Drug use: Unknown    Sexual activity: Not on file         Review of Systems   Unable to perform ROS: Patient nonverbal (intubated and sedated)     Objective:     Vital Signs (Most Recent):  Temp: 96.1 °F (35.6 °C) (11/10/17 0302)  Pulse: (!) 25 (11/10/17 1200)  Resp: 16 (11/10/17 1200)  BP: 117/82 (11/10/17 0901)  SpO2: (!) 33 % (11/10/17 1200) Vital Signs (24h Range):  Temp:  [96.1 °F (35.6 °C)-96.4 °F (35.8 °C)] 96.1 °F (35.6 °C)  Pulse:  [] 25  Resp:  [15-22] 16  SpO2:  [33 %-100 %] 33 %  BP: ()/() 117/82     Weight: 38.4 kg (84 lb 10.5 oz)  Body mass index is 16.53 kg/m².      Intake/Output Summary (Last 24 hours) at 11/10/17 1346  Last data filed at 11/10/17 0600   Gross per 24 hour   Intake          1896.36 ml   Output              875 ml   Net          1021.36 ml       Physical Exam   Constitutional: She appears cachectic. She has a sickly appearance. She is intubated and restrained.   HENT:   Head: Atraumatic.   Eyes: Conjunctivae are normal.   Neck: No JVD present. No tracheal deviation present.   Cardiovascular: Regular rhythm.   No extrasystoles are present. Bradycardia present.    No murmur heard.  Pulses:       Radial pulses are 1+ on the right side, and 1+ on the left side.        Dorsalis pedis pulses are 1+ on the right side, and 1+ on the left side.   Pulmonary/Chest: She is intubated. No respiratory distress. She has decreased breath sounds. She has no wheezes. She  has no rales.   Abdominal: Soft. She exhibits no distension. Bowel sounds are decreased. There is no tenderness.       Musculoskeletal: She exhibits no edema.   Neurological: GCS eye subscore is 2. GCS verbal subscore is 1. GCS motor subscore is 5.   Pt is deaf and non verbal at baseline; sedated on propofol   Skin: Skin is dry. Capillary refill takes 2 to 3 seconds. No cyanosis.   Cool  No skin lesions/breakdown       Vents:  Vent Mode: A/C (11/10/17 1200)  Ventilator Initiated: Yes (11/09/17 1310)  Set Rate: 16 bmp (11/10/17 1200)  Vt Set: (S) 350 mL (11/10/17 1200)  Pressure Support: 0 cmH20 (11/10/17 1200)  PEEP/CPAP: 3 cmH20 (11/10/17 1200)  Oxygen Concentration (%): 35 (11/10/17 1200)  Peak Airway Pressure: 17 cmH2O (11/10/17 1200)  Plateau Pressure: 0 cmH20 (11/10/17 1200)  Total Ve: 6.48 mL (11/10/17 1200)  F/VT Ratio<105 (RSBI): (!) 74.42 (11/10/17 1200)    Lines/Drains/Airways     Drain                 Gastrostomy/Enterostomy Percutaneous endoscopic gastrostomy (PEG) midline -- days         Urethral Catheter 11/09/17 1330 Latex 16 Fr. 1 day         NG/OG Tube 11/09/17 1810 orogastric 12 Fr. Right mouth less than 1 day          Airway                 Airway - Non-Surgical 11/09/17 1300 Endotracheal Tube 1 day          Peripheral Intravenous Line                 Peripheral IV - Single Lumen 11/09/17 1258 Right Antecubital 1 day                Significant Labs:    CBC/Anemia Profile:    Recent Labs  Lab 11/09/17  1410 11/10/17  1019   WBC 19.17* 6.76   HGB 13.3 9.9*   HCT 40.7 30.6*    209   MCV 78* 78*   RDW 16.3* 15.8*        Chemistries:    Recent Labs  Lab 11/09/17  1410 11/10/17  1019    141   K 4.8 3.4*    113*   CO2 20* 21*   BUN 15 9   CREATININE 0.7 0.6   CALCIUM 8.8 8.7   ALBUMIN 3.3*  --    PROT 7.4  --    BILITOT 0.5  --    ALKPHOS 77  --    ALT 15  --    AST 30  --    MG  --  1.8   PHOS  --  2.1*       All pertinent labs within the past 24 hours have been  reviewed.  [facility]    Recent Labs  Lab 11/10/17  1154   PH 7.317*   PO2 157*   PCO2 40.2   HCO3 20.6*   BE -6         Significant Imaging:   I have reviewed all pertinent imaging results/findings within the past 24 hours.

## 2017-11-10 NOTE — ED NOTES
Pt removed from restraints upon arrival. Pt was given 10mg total Versed en route in 2 subsequent doses of 5mg each

## 2017-11-10 NOTE — PROVIDER PROGRESS NOTES - EMERGENCY DEPT.
Encounter Date: 11/9/2017    ED Physician Progress Notes       SCRIBE NOTE: I, Griselda Kennedy, am scribing for, and in the presence of,  Kali Day MD.  Physician Statement: IKali MD, personally performed the services described in this documentation as scribed by Griselda Kennedy in my presence, and it is both accurate and complete.           7:40 PM: Dr. Day evaluated pt. Patient was transferred from [facility] for admission to ICU. Per EMS, they ran out of propofol en route and patient was mildly agitated. Propofol restarted at this time. Pt is resting comfortably on the ventilator.    8:21 PM: Discussed case with Dr. Cervantes (Hospital Medicine), agrees with current care and management of pt and accepts admission.   Admitting Service: Hospital medicine   Admitting Physician: Dr. Crevantes  Admit to: ICU

## 2017-11-10 NOTE — ED NOTES
Attempted to call Efrain Pena, pts listed family member twice and was unsuccessful. Called pts nurse Mo at [facility] and notified her of patients condition.

## 2017-11-10 NOTE — ASSESSMENT & PLAN NOTE
Resident of [facility].  Need to implement stricter precautions at the NH so patient cannot ingest oral food.

## 2017-11-10 NOTE — SUBJECTIVE & OBJECTIVE
Past Medical History:   Diagnosis Date    Anxiety     Aphasia     Cerebral palsy     Deaf     Depression     GERD (gastroesophageal reflux disease)     Insomnia     Pressure ulcer     Seizures        History reviewed. No pertinent surgical history.    Review of patient's allergies indicates:  No Known Allergies    No current facility-administered medications on file prior to encounter.      Current Outpatient Prescriptions on File Prior to Encounter   Medication Sig    fluphenazine (PROLIXIN) 10 MG tablet 10 mg by Per G Tube route once daily.     lorazepam (ATIVAN) 1 MG tablet 1 mg by Per G Tube route every 6 (six) hours as needed for Anxiety.     olanzapine (ZYPREXA) 10 MG tablet 15 mg by Per G Tube route every evening.     trazodone (DESYREL) 100 MG tablet 100 mg by Per G Tube route every evening.     valsartan (DIOVAN) 80 MG tablet 80 mg by Per G Tube route once daily.     ziprasidone (GEODON) 20 MG Cap Take 20 mg by mouth 2 (two) times daily.    zolpidem (AMBIEN CR) 12.5 MG CR tablet Take 12.5 mg by mouth nightly as needed for Insomnia.     Family History     H/o cerebral palsy, currently intubated and sedated, no family at bedside to obtain FH.        Social History Main Topics    Smoking status: Unknown If Ever Smoked    Smokeless tobacco: Not on file    Alcohol use Not on file      Comment: unable to assess    Drug use: Unknown    Sexual activity: Not on file     Review of Systems   Unable to perform ROS: Intubated     Objective:     Vital Signs (Most Recent):  Temp: 98.7 °F (37.1 °C) (11/09/17 1346)  Pulse: 78 (11/09/17 2035)  Resp: 17 (11/09/17 2035)  BP: 118/77 (11/09/17 2035)  SpO2: 100 % (11/09/17 2035) Vital Signs (24h Range):  Temp:  [98.6 °F (37 °C)-98.7 °F (37.1 °C)] 98.7 °F (37.1 °C)  Pulse:  [] 78  Resp:  [10-44] 17  SpO2:  [100 %] 100 %  BP: (105-142)/() 118/77     Weight: 36.3 kg (80 lb)  There is no height or weight on file to calculate BMI.    Physical Exam    Constitutional: No distress. She is intubated.   Malnourished, cachectic, intubated   HENT:   Head: Normocephalic and atraumatic.   Eyes: Conjunctivae are normal. Pupils are equal, round, and reactive to light. No scleral icterus.   Cardiovascular: Normal rate, regular rhythm, normal heart sounds and intact distal pulses.    No murmur heard.  Pulmonary/Chest: Breath sounds normal. No accessory muscle usage. She is intubated.   Abdominal: Soft. She exhibits no distension.   Musculoskeletal: She exhibits no edema.   Lymphadenopathy:     She has no cervical adenopathy.   Neurological:   Intubated, sedated on propofol   Skin: Skin is warm. She is not diaphoretic. No erythema.   No skin breakdown noted   Nursing note and vitals reviewed.       Significant Labs:   ABGs:   Recent Labs  Lab 11/09/17  1425   PH 7.427   PCO2 42.4   HCO3 28.0   POCSATURATED 100   BE 4     Bilirubin:   Recent Labs  Lab 10/19/17  2030 11/09/17  1410   BILITOT <0.1* 0.5     BMP:   Recent Labs  Lab 11/09/17  1410         K 4.8      CO2 20*   BUN 15   CREATININE 0.7   CALCIUM 8.8     CBC:   Recent Labs  Lab 11/09/17  1410   WBC 19.17*   HGB 13.3   HCT 40.7        CMP:   Recent Labs  Lab 11/09/17  1410      K 4.8      CO2 20*      BUN 15   CREATININE 0.7   CALCIUM 8.8   PROT 7.4   ALBUMIN 3.3*   BILITOT 0.5   ALKPHOS 77   AST 30   ALT 15   ANIONGAP 13   EGFRNONAA >60.0     Cardiac Markers:   Recent Labs  Lab 11/09/17  1410   BNP 23     Troponin:   Recent Labs  Lab 11/09/17  1410   TROPONINI <0.006     TSH: No results for input(s): TSH in the last 4320 hours.  Urine Studies:   Recent Labs  Lab 11/09/17  1605   COLORU Yellow   APPEARANCEUA Clear   PHUR 7.0   SPECGRAV 1.025   PROTEINUA Trace*   GLUCUA Negative   KETONESU Negative   BILIRUBINUA Negative   OCCULTUA Negative   NITRITE Negative   UROBILINOGEN Negative   LEUKOCYTESUR Negative     All pertinent labs within the past 24 hours have been  reviewed.    Significant Imaging: I have reviewed and interpreted all pertinent imaging results/findings within the past 24 hours.     Imaging Results          X-Ray Chest AP Portable (Final result)  Result time 11/09/17 13:36:44    Final result by Enrique oLera MD (11/09/17 13:36:44)                 Impression:       Widening of the upper mediastinum which correlates with CT findings of a dilated esophagus      Electronically signed by: ENRIUQE LOERA MD  Date:     11/09/17  Time:    13:36              Narrative:    Clinical Data:Preoperative    Comparison:  none    Findings:  Single view of the chest.   There is widening of the upper mediastinum which correlates with CT findings of a dilated esophagus with ingested material throughout. Endotracheal tube appears satisfactory at the thoracic inlet.    Cardiac silhouette is normal.  The lungs demonstrate no evidence of active disease.  No evidence of pleural effusion or pneumothorax.  Bones appear intact. G-tube is seen in the upper abdomen.                              I have independently reviewed and interpreted the EKG. My findings include NSR.    I have independently reviewed all pertinent labs within the past 24 hours.    I have independently reviewed, visualized and interpreted all pertinent imaging results within the past 24 hours and discussed the findings with the ED physician.

## 2017-11-10 NOTE — ASSESSMENT & PLAN NOTE
Full vent support until food bolus/EGD per GI; settings reviewed and adjusted per abg; VAP prophylaxis

## 2017-11-10 NOTE — HPI
The patient presented to the ER for respiratory distress requiring intubation. She has a history of cerebral palsy and lives in a nursing facility. She has a history of esophageal stricture and it managed by PEG tube. However, the patient reportedly ate chips so there was concern for aspiration. A CT scan today showed a dilated esophagus with fluid and possible mass in the distal esophagus. PEG tube in good position. Moderate constipation. We have been consulted for possible EGD. Decrease in WBC count, BUN and Hgb likely from fluids received. The patient is noted to be bradycardic.

## 2017-11-10 NOTE — HOSPITAL COURSE
Intubated and sedated on transfer to the ICU and responds to pain.  Difficulty with access.  ICU NP attempting to place midline under US guidance.  ABG 7.47/33.5/216/24.9 with a pO2 100% on ventilation.  WBC 6.76<--19.   11 November:  Endoscopy at the bedside by Dr. Mcdermott showed a large amount of saliva and mucus in the esophagus and minimal solid material.  Esophageal stricture 2 mm wide by 20 mm long.  Two stage balloon dilatation followed by visualization of the stomach which was normal.  Successfully extubated and remained hemodynamically stable.  Returned to baseline.  Arrangements for return to [facility] but the facility refused to accept her on charges that she was not stable without supporting evidence.  Plan to discharge and continue Pantoprazole.  Follow up with Gastroenterology in two weeks for repeat EGD for second dilatation and endoscopic ultrasound guided biopsy.  I have seen and examined Ms. Urbina on the day of discharge and deemed her appropriate to return to her nursing facility.

## 2017-11-11 ENCOUNTER — SURGERY (OUTPATIENT)
Age: 42
End: 2017-11-11

## 2017-11-11 ENCOUNTER — ANESTHESIA (OUTPATIENT)
Dept: ENDOSCOPY | Facility: HOSPITAL | Age: 42
DRG: 208 | End: 2017-11-11
Payer: MEDICAID

## 2017-11-11 VITALS — RESPIRATION RATE: 16 BRPM

## 2017-11-11 PROBLEM — E87.6 HYPOKALEMIA: Status: ACTIVE | Noted: 2017-11-11

## 2017-11-11 LAB
ALLENS TEST: ABNORMAL
ANION GAP SERPL CALC-SCNC: 6 MMOL/L
BASOPHILS # BLD AUTO: 0.03 K/UL
BASOPHILS NFR BLD: 0.7 %
BUN SERPL-MCNC: 7 MG/DL
CALCIUM SERPL-MCNC: 8.5 MG/DL
CHLORIDE SERPL-SCNC: 116 MMOL/L
CO2 SERPL-SCNC: 21 MMOL/L
CREAT SERPL-MCNC: 0.6 MG/DL
DELSYS: ABNORMAL
DIFFERENTIAL METHOD: ABNORMAL
EOSINOPHIL # BLD AUTO: 0.1 K/UL
EOSINOPHIL NFR BLD: 1.3 %
ERYTHROCYTE [DISTWIDTH] IN BLOOD BY AUTOMATED COUNT: 17 %
ERYTHROCYTE [SEDIMENTATION RATE] IN BLOOD BY WESTERGREN METHOD: 16 MM/H
EST. GFR  (AFRICAN AMERICAN): >60 ML/MIN/1.73 M^2
EST. GFR  (NON AFRICAN AMERICAN): >60 ML/MIN/1.73 M^2
FIO2: 35
GLUCOSE SERPL-MCNC: 56 MG/DL
HCO3 UR-SCNC: 21.4 MMOL/L (ref 24–28)
HCT VFR BLD AUTO: 31.6 %
HGB BLD-MCNC: 10 G/DL
LYMPHOCYTES # BLD AUTO: 1.2 K/UL
LYMPHOCYTES NFR BLD: 26.9 %
MAGNESIUM SERPL-MCNC: 1.7 MG/DL
MCH RBC QN AUTO: 24.8 PG
MCHC RBC AUTO-ENTMCNC: 31.6 G/DL
MCV RBC AUTO: 78 FL
MODE: ABNORMAL
MONOCYTES # BLD AUTO: 0.3 K/UL
MONOCYTES NFR BLD: 6.8 %
NEUTROPHILS # BLD AUTO: 2.9 K/UL
NEUTROPHILS NFR BLD: 64.5 %
PCO2 BLDA: 40.1 MMHG (ref 35–45)
PEEP: 3
PH SMN: 7.34 [PH] (ref 7.35–7.45)
PHOSPHATE SERPL-MCNC: 3.1 MG/DL
PLATELET # BLD AUTO: 205 K/UL
PMV BLD AUTO: ABNORMAL FL
PO2 BLDA: 188 MMHG (ref 80–100)
POC BE: -4 MMOL/L
POC SATURATED O2: 100 % (ref 95–100)
POTASSIUM SERPL-SCNC: 3.2 MMOL/L
RBC # BLD AUTO: 4.03 M/UL
SAMPLE: ABNORMAL
SITE: ABNORMAL
SODIUM SERPL-SCNC: 143 MMOL/L
VT: 350
WBC # BLD AUTO: 4.53 K/UL

## 2017-11-11 PROCEDURE — 85025 COMPLETE CBC W/AUTO DIFF WBC: CPT

## 2017-11-11 PROCEDURE — 25000003 PHARM REV CODE 250: Performed by: INTERNAL MEDICINE

## 2017-11-11 PROCEDURE — 99900035 HC TECH TIME PER 15 MIN (STAT)

## 2017-11-11 PROCEDURE — 84100 ASSAY OF PHOSPHORUS: CPT

## 2017-11-11 PROCEDURE — 36415 COLL VENOUS BLD VENIPUNCTURE: CPT

## 2017-11-11 PROCEDURE — 63600175 PHARM REV CODE 636 W HCPCS: Performed by: INTERNAL MEDICINE

## 2017-11-11 PROCEDURE — 83735 ASSAY OF MAGNESIUM: CPT

## 2017-11-11 PROCEDURE — 43249 ESOPH EGD DILATION <30 MM: CPT | Mod: ,,, | Performed by: INTERNAL MEDICINE

## 2017-11-11 PROCEDURE — 36600 WITHDRAWAL OF ARTERIAL BLOOD: CPT

## 2017-11-11 PROCEDURE — 25000003 PHARM REV CODE 250: Performed by: NURSE PRACTITIONER

## 2017-11-11 PROCEDURE — 99291 CRITICAL CARE FIRST HOUR: CPT | Mod: ,,, | Performed by: INTERNAL MEDICINE

## 2017-11-11 PROCEDURE — 63600175 PHARM REV CODE 636 W HCPCS: Performed by: EMERGENCY MEDICINE

## 2017-11-11 PROCEDURE — 63600175 PHARM REV CODE 636 W HCPCS: Performed by: NURSE ANESTHETIST, CERTIFIED REGISTERED

## 2017-11-11 PROCEDURE — 43249 ESOPH EGD DILATION <30 MM: CPT | Performed by: INTERNAL MEDICINE

## 2017-11-11 PROCEDURE — 0D738ZZ DILATION OF LOWER ESOPHAGUS, VIA NATURAL OR ARTIFICIAL OPENING ENDOSCOPIC: ICD-10-PCS | Performed by: INTERNAL MEDICINE

## 2017-11-11 PROCEDURE — C9113 INJ PANTOPRAZOLE SODIUM, VIA: HCPCS | Performed by: EMERGENCY MEDICINE

## 2017-11-11 PROCEDURE — 37000008 HC ANESTHESIA 1ST 15 MINUTES: Performed by: INTERNAL MEDICINE

## 2017-11-11 PROCEDURE — 94003 VENT MGMT INPAT SUBQ DAY: CPT

## 2017-11-11 PROCEDURE — 27000221 HC OXYGEN, UP TO 24 HOURS

## 2017-11-11 PROCEDURE — 37000009 HC ANESTHESIA EA ADD 15 MINS: Performed by: INTERNAL MEDICINE

## 2017-11-11 PROCEDURE — C1726 CATH, BAL DIL, NON-VASCULAR: HCPCS | Performed by: INTERNAL MEDICINE

## 2017-11-11 PROCEDURE — 27201042 HC RETRIEVAL NET: Performed by: INTERNAL MEDICINE

## 2017-11-11 PROCEDURE — 25000003 PHARM REV CODE 250: Performed by: NURSE ANESTHETIST, CERTIFIED REGISTERED

## 2017-11-11 PROCEDURE — 20000000 HC ICU ROOM

## 2017-11-11 PROCEDURE — 63700000 PHARM REV CODE 250 ALT 637 W/O HCPCS

## 2017-11-11 PROCEDURE — 25000003 PHARM REV CODE 250: Performed by: EMERGENCY MEDICINE

## 2017-11-11 PROCEDURE — 80048 BASIC METABOLIC PNL TOTAL CA: CPT

## 2017-11-11 PROCEDURE — 82803 BLOOD GASES ANY COMBINATION: CPT

## 2017-11-11 RX ORDER — ROCURONIUM BROMIDE 10 MG/ML
INJECTION, SOLUTION INTRAVENOUS
Status: DISCONTINUED | OUTPATIENT
Start: 2017-11-11 | End: 2017-11-11

## 2017-11-11 RX ORDER — ZIPRASIDONE HYDROCHLORIDE 20 MG/1
20 CAPSULE ORAL 2 TIMES DAILY
Status: DISCONTINUED | OUTPATIENT
Start: 2017-11-11 | End: 2017-11-14 | Stop reason: HOSPADM

## 2017-11-11 RX ORDER — MIDAZOLAM HYDROCHLORIDE 1 MG/ML
INJECTION, SOLUTION INTRAMUSCULAR; INTRAVENOUS
Status: DISCONTINUED | OUTPATIENT
Start: 2017-11-11 | End: 2017-11-11

## 2017-11-11 RX ORDER — OLANZAPINE 5 MG/1
15 TABLET ORAL NIGHTLY
Status: DISCONTINUED | OUTPATIENT
Start: 2017-11-11 | End: 2017-11-14 | Stop reason: HOSPADM

## 2017-11-11 RX ORDER — SODIUM CHLORIDE, SODIUM LACTATE, POTASSIUM CHLORIDE, CALCIUM CHLORIDE 600; 310; 30; 20 MG/100ML; MG/100ML; MG/100ML; MG/100ML
INJECTION, SOLUTION INTRAVENOUS CONTINUOUS PRN
Status: DISCONTINUED | OUTPATIENT
Start: 2017-11-11 | End: 2017-11-11

## 2017-11-11 RX ORDER — TRAZODONE HYDROCHLORIDE 100 MG/1
100 TABLET ORAL NIGHTLY
Status: DISCONTINUED | OUTPATIENT
Start: 2017-11-11 | End: 2017-11-14 | Stop reason: HOSPADM

## 2017-11-11 RX ORDER — POTASSIUM CHLORIDE 20 MEQ/15ML
40 SOLUTION ORAL ONCE
Status: COMPLETED | OUTPATIENT
Start: 2017-11-11 | End: 2017-11-11

## 2017-11-11 RX ORDER — GLYCOPYRROLATE 0.2 MG/ML
INJECTION INTRAMUSCULAR; INTRAVENOUS
Status: DISCONTINUED | OUTPATIENT
Start: 2017-11-11 | End: 2017-11-11

## 2017-11-11 RX ORDER — FLUPHENAZINE HYDROCHLORIDE 1 MG/1
10 TABLET ORAL NIGHTLY
Status: DISCONTINUED | OUTPATIENT
Start: 2017-11-11 | End: 2017-11-14 | Stop reason: HOSPADM

## 2017-11-11 RX ORDER — PROPOFOL 10 MG/ML
VIAL (ML) INTRAVENOUS CONTINUOUS PRN
Status: DISCONTINUED | OUTPATIENT
Start: 2017-11-11 | End: 2017-11-11

## 2017-11-11 RX ORDER — BENZTROPINE MESYLATE 1 MG/1
1 TABLET ORAL 2 TIMES DAILY
Status: DISCONTINUED | OUTPATIENT
Start: 2017-11-11 | End: 2017-11-14 | Stop reason: HOSPADM

## 2017-11-11 RX ORDER — FLUPHENAZINE HYDROCHLORIDE 2.5 MG/1
10 TABLET ORAL DAILY
Status: DISCONTINUED | OUTPATIENT
Start: 2017-11-11 | End: 2017-11-11

## 2017-11-11 RX ADMIN — CHLORHEXIDINE GLUCONATE 15 ML: 1.2 RINSE ORAL at 09:11

## 2017-11-11 RX ADMIN — TRAZODONE HYDROCHLORIDE 100 MG: 100 TABLET ORAL at 09:11

## 2017-11-11 RX ADMIN — BENZTROPINE MESYLATE 1 MG: 1 TABLET ORAL at 09:11

## 2017-11-11 RX ADMIN — GLYCOPYRROLATE 0.2 MG: 0.2 INJECTION INTRAMUSCULAR; INTRAVENOUS at 07:11

## 2017-11-11 RX ADMIN — SODIUM CHLORIDE: 0.9 INJECTION, SOLUTION INTRAVENOUS at 03:11

## 2017-11-11 RX ADMIN — OLANZAPINE 15 MG: 5 TABLET, FILM COATED ORAL at 09:11

## 2017-11-11 RX ADMIN — MIDAZOLAM HYDROCHLORIDE 2 MG: 1 INJECTION, SOLUTION INTRAMUSCULAR; INTRAVENOUS at 07:11

## 2017-11-11 RX ADMIN — POTASSIUM CHLORIDE 40 MEQ: 20 SOLUTION ORAL at 01:11

## 2017-11-11 RX ADMIN — SODIUM CHLORIDE, SODIUM LACTATE, POTASSIUM CHLORIDE, AND CALCIUM CHLORIDE: 600; 310; 30; 20 INJECTION, SOLUTION INTRAVENOUS at 07:11

## 2017-11-11 RX ADMIN — PROPOFOL 30 MCG/KG/MIN: 10 INJECTION, EMULSION INTRAVENOUS at 07:11

## 2017-11-11 RX ADMIN — PHENYTOIN 100 MG: 125 SUSPENSION ORAL at 09:11

## 2017-11-11 RX ADMIN — PHENYTOIN 100 MG: 125 SUSPENSION ORAL at 05:11

## 2017-11-11 RX ADMIN — ZIPRASIDONE HYDROCHLORIDE 20 MG: 20 CAPSULE ORAL at 09:11

## 2017-11-11 RX ADMIN — PHENYTOIN 100 MG: 125 SUSPENSION ORAL at 01:11

## 2017-11-11 RX ADMIN — FLUPHENAZINE HYDROCHLORIDE 10 MG: 1 TABLET, FILM COATED ORAL at 09:11

## 2017-11-11 RX ADMIN — ROCURONIUM BROMIDE 50 MG: 10 INJECTION, SOLUTION INTRAVENOUS at 07:11

## 2017-11-11 RX ADMIN — PANTOPRAZOLE SODIUM 40 MG: 40 INJECTION, POWDER, FOR SOLUTION INTRAVENOUS at 09:11

## 2017-11-11 RX ADMIN — PROPOFOL 30 MCG/KG/MIN: 10 INJECTION, EMULSION INTRAVENOUS at 01:11

## 2017-11-11 RX ADMIN — ENOXAPARIN SODIUM 30 MG: 100 INJECTION SUBCUTANEOUS at 04:11

## 2017-11-11 RX ADMIN — SODIUM CHLORIDE 75 ML/HR: 0.9 INJECTION, SOLUTION INTRAVENOUS at 05:11

## 2017-11-11 NOTE — PLAN OF CARE
Problem: Patient Care Overview  Goal: Plan of Care Review  Outcome: Ongoing (interventions implemented as appropriate)  Vitals signs closely monitored. Fall precautions in place. Patient turned every two hours. Pain assessed every two hours. Safety promoted. Infection risks reduced. Pt extubated at 1230

## 2017-11-11 NOTE — PROGRESS NOTES
LDA was never done on intubation. Patient is now extubated on 3L Nc. Extubation tolerated well. Extubated ordered per Dr. Bill.

## 2017-11-11 NOTE — PROGRESS NOTES
"[facility] Hospital Medicine  Progress Note    Patient Name: Belinda Urbina  MRN: [id number]  Patient Class: IP- Inpatient   Admission Date: 11/9/2017  Length of Stay: 2 days  Attending Physician: Woody Flower MD  Primary Care Provider: Santhosh Mendiola MD        Subjective:     Principal Problem:Acute respiratory failure    HPI:  Ms. Urbina is an unfortunate 43 y/o AA female with h/o cerebral palsy, aphasic, deafness was transferred from [facility] ED, intubated. No family around, history obtained from chart review.    Patient is a resident of [facility], has a PEG in place but continues to somehow get her hands on food and eats them. Per chart review she is supposed to be NPO at all times. She had aspirated in the past month and went into respiratory failure, was emergently intubated and was taken to [facility]. She had EGD done at that time, and had large amount of retained food removed from the esophagus. Eventually discharged back to the nursing home. Per chart review, patient had similar episode again earlier today, placed food in her mouth and aspirated. She woas found to have lot of secretions when EMS arrived. She was intubated en route to [facility], and was diverted to [facility] ED. Transferred here to [facility] ICU for further monitoring. Currently she remains intubated on propofol. No family at bedside.      Per H&P done at [facility] on 10/20/2017:  "Ms. Urbina is a 41yo F w/ a PMHx of severe cerebral palsy (deaf, non-verbal), s/p PEG, seizure disorder (previously on dilantin, no anti-epileptics on list from nursing home), and behavioral issues (prior respiratory failure after choking on food/foreign body) who was transferred from [facility] following intubation for acute hypoxemic respiratory failure. Pt was noted to be tachypneic and in distress at nursing home. She has a history of placing food in mouth even though she's not supposed to have any oral intake, and therefore, NH was " "concerned that this happened. On arrival to [facility], pt was noted to have O2 sats in 50's - 90s which corrected readily with supplemental oxygen. Pt's ABG had a pH of 7.1, CO2 of 98, O2 of 58, and HCO3 of 31. Physicians there were unable to evaluate vocal cords without sedated exam, and therefore, pt was intubated. Upon intubation, it was noted that pt had a large amount of fruit as well as aspirated tube feeds around her vocal cords. At [facility], pt was noted to have a leukocytosis of 18; however, the remainder of her labs were unremarkable. She had a CTA completed which was negative for pulmonary embolism but did revealed a patchy consolidation in the left lower lobe as well as marked esophageal dilation with irregularity at the gastroesophageal junction (seen on prior imaging as well, prior scope revealing severe esophagitis and possible gastroparesis, biopsies positive for candida unclear if ever treated for this)".    EGD done 10/22/17 at [facility]:    Esophagus: Very narrowed stricture at distal esophagus. Severe distal esophagitis. White plaque in esophagus, possibly c/w candida. Large amount of fluid and retained food (peaches). Removed with multiple passes with Adam net.    Stomach:Not examined  Duodenum: Not examined  Complications: None    Impression:  1. Severe distal esophagitis with very narrowed distal esophageal stricture.  2. Food bolus status post removal.  3. Possible candidal esophagitis.      Hospital Course:  10 November:   Intubated and sedated.  Responds to pain.  Difficulty with access. ICU NP attempting to place midline under US guidance.  ABG 7.47/33.5/216/24.9 with a pO2 100% on ventilation.  WBC 6.76<--19.   11 November:  Endoscopy at the bedside by Dr. Mcdermott showed a large amount of saliva and mucus in the esophagus and minimal solid material.  Esophageal stricture 2 mm wide by 20 mm long.  Two stage balloon dilatation followed by visualization of the stomach which was normal.    Interval " History:  Undergoing endoscopy.  Episode of asymptomatic bradycardia to the 20s last shift and remained hemodynamically stable.    Review of Systems   Unable to perform ROS: Patient nonverbal (Intubated and sedated.)     Objective:     Vital Signs (Most Recent):  Temp: 96.7 °F (35.9 °C) (11/11/17 0714)  Pulse: (!) 56 (11/11/17 0858)  Resp: 16 (11/11/17 0858)  BP: (!) 169/114 (11/11/17 0832)  SpO2: 100 % (11/11/17 0858) Vital Signs (24h Range):  Temp:  [96 °F (35.6 °C)-97.4 °F (36.3 °C)] 96.7 °F (35.9 °C)  Pulse:  [35-80] 56  Resp:  [15-19] 16  SpO2:  [89 %-100 %] 100 %  BP: ()/() 169/114     Weight: 40.1 kg (88 lb 6.5 oz)  Body mass index is 17.27 kg/m².    Intake/Output Summary (Last 24 hours) at 11/11/17 0952  Last data filed at 11/11/17 0819   Gross per 24 hour   Intake           2866.7 ml   Output              675 ml   Net           2191.7 ml      Physical Exam   Constitutional: No distress.   Thin and frail   HENT:   Head: Normocephalic and atraumatic.   Mouth/Throat: Oropharynx is clear and moist.   Eyes: Conjunctivae and EOM are normal. Pupils are equal, round, and reactive to light.   Neck: Neck supple. No JVD present. No thyromegaly present.   Cardiovascular: Normal rate and regular rhythm.  Exam reveals no gallop and no friction rub.    No murmur heard.  Pulmonary/Chest: Effort normal. She has no wheezes. She has no rales.   Coarse crackles bilaterally.   Abdominal: Soft. Bowel sounds are normal. She exhibits no distension. There is no tenderness. There is no rebound and no guarding.   PEG tube in place and functioning.   Musculoskeletal: Normal range of motion. She exhibits no edema or deformity.   Lymphadenopathy:     She has no cervical adenopathy.   Neurological: She is alert. She has normal reflexes.   Skin: Skin is warm and dry. No rash noted.   Nursing note and vitals reviewed.      Significant Labs:   CBC:   Recent Labs  Lab 11/09/17  1410 11/10/17  1019 11/11/17  0625   WBC 19.17* 6.76  4.53   HGB 13.3 9.9* 10.0*   HCT 40.7 30.6* 31.6*    209 205     CMP:   Recent Labs  Lab 11/09/17  1410 11/10/17  1019 11/11/17  0840    141 143   K 4.8 3.4* 3.2*    113* 116*   CO2 20* 21* 21*    95 56*   BUN 15 9 7   CREATININE 0.7 0.6 0.6   CALCIUM 8.8 8.7 8.5*   PROT 7.4  --   --    ALBUMIN 3.3*  --   --    BILITOT 0.5  --   --    ALKPHOS 77  --   --    AST 30  --   --    ALT 15  --   --    ANIONGAP 13 7* 6*   EGFRNONAA >60.0 >60 >60       Significant Imaging: I have reviewed all pertinent imaging results/findings within the past 24 hours.    Assessment/Plan:      * Acute respiratory failure    Recurrent hospitalization for ingestion food and aspirating on it.  Intubated for respiratory distress in the field.  Pulmonary for vent management, appreciate recs.        Seizure disorder    Monitor closely and continue home Phenytoin.        Esophageal obstruction due to food impaction    Endoscopy at the bedside by Dr. Mcdermott showed a large amount of saliva and mucus in the esophagus and minimal solid material.  Esophageal stricture 2 mm wide by 20 mm long.  Two stage balloon dilatation followed by visualization of the stomach which was normal.        Bradycardia    Asymptomatic and hemodynamically stable.  Atropine as needed at bedside if patient becomes symptomatic.  Suspect vagal nerve mediated.  Cardiac monitor.        Severe protein-calorie malnutrition    BMI < 18.  Nutrition consult.        Cerebral palsy    Resident of [facility].  Need to implement stricter precautions at the NH so patient cannot ingest oral food.        Aspiration into respiratory tract    Per report, large amount of oral secretions suctioned out.  Will continue coverage with broad spectrum IV antibiotics.  De-escalate per pulmonology, appreciate recs.  Follow up on cultures.  No evidence of pneumonia on CXR but is high risk as she did aspirate and WBC 19,000.          VTE Risk Mitigation          Ordered     enoxaparin injection 30 mg  Daily     Route:  Subcutaneous        11/09/17 2147     Medium Risk of VTE  Once      11/09/17 2111     Place sequential compression device  Until discontinued      11/09/17 2111          Critical care time spent on the evaluation and treatment of severe organ dysfunction, review of pertinent labs and imaging studies, discussions with consulting providers and discussions with patient/family: 30 minutes.    Woody Flower MD  Department of Hospital Medicine   [facility]

## 2017-11-11 NOTE — CONSULTS
[facility]  Tube Feed Recommendations      Formula: Isosource 1.5   Type: Bolus  Rate: 250 ml every 6 hours (or 4 cans daily total volume)  Water Flushes: 50 ml flush before and after each can plus additional water flushes of 150 ml BID for hydration or as needed per MD  Projected intake: 1500 kcal, 68 g protein, 1464 ml water.        Hold for residuals >500 mL. Will continue to monitor.      Goals: 1. Initiate nutrition within 72 hrs. 2. Meet 85- 100 % of estimated nutritional needs without s/s of GI distress   Nutrition Goal Status: new      Comments: Pt now extubated on nasal canula. Full RD assessment 11/10/17.    Assessment and Plan  Acute respiratory failure     Nutrition Problem  Inadequate energy intake     Related to (etiology):   Intubation      Signs and Symptoms (as evidenced by):    NPO, no alternative means of nutrition.      Interventions/Recommendations (treatment strategy):  See RD recs 11/10/17     Nutrition Diagnosis Status:   New          Severe protein-calorie malnutrition     Nutrition Problem  Malnutrition      Related to (etiology):   Inadequate energy intake      Signs and Symptoms (as evidenced by):   Per epic records weight loss of 6 lbs within 1 month (6%), per physical assessment ~ muscle mass loss.       Interventions/Recommendations (treatment strategy):  Refer to RD recs 11/10/17     Nutrition Diagnosis Status:   New     Follow-Up: 2x/wk

## 2017-11-11 NOTE — ASSESSMENT & PLAN NOTE
Endoscopy at the bedside by Dr. Mcdermott showed a large amount of saliva and mucus in the esophagus and minimal solid material.  Esophageal stricture 2 mm wide by 20 mm long.  Two stage balloon dilatation followed by visualization of the stomach which was normal.

## 2017-11-11 NOTE — TRANSFER OF CARE
Anesthesia Transfer of Care Note    Patient: Belinda Major    Procedure(s) Performed: Procedure(s) (LRB):  ESOPHAGOGASTRODUODENOSCOPY (EGD) (N/A)    Patient location: ICU    Anesthesia Type: MAC    Post pain: adequate analgesia    Post assessment: no apparent anesthetic complications and tolerated procedure well    Post vital signs: stable    Level of consciousness: sedated    Nausea/Vomiting: no nausea/vomiting    Complications: none    Transfer of care protocol was followed      Last vitals:   Visit Vitals  BP (!) 146/84   Pulse (!) 59   Temp 35.9 °C (96.7 °F) (Temporal)   Resp 16   Ht 5' (1.524 m)   Wt 40.1 kg (88 lb 6.5 oz)   LMP  (LMP Unknown)   SpO2 100%   Breastfeeding? No   BMI 17.27 kg/m²

## 2017-11-11 NOTE — PLAN OF CARE
Problem: Patient Care Overview  Goal: Plan of Care Review  Outcome: Ongoing (interventions implemented as appropriate)  Patient remains on ventilator with ETT in situ @ 21cm at the teeth with FIO2-35 %, SPO2- 96 % - 100%. Patient responding to vigorous stimulation(rigorous touch), unable to follow simple commands.Heart rate remains -30's over night , patient remains asymptomatic. She continues on propofol drip @ 30mcg/kg/min at time of reporting and is being titrated for RASS - -1. N/SALINE CONTINUES @ 75ML/HR.Peg tube in place @ LUQ and is clamped at time of reporting. Urine output 20-50 ml /hr. No episode of seizure observed.Patient continues to be observed.

## 2017-11-11 NOTE — ASSESSMENT & PLAN NOTE
TF per RD recs to meet caloric goals     Isosource 1.5 bolus 250 ml every 6 hours (or 4 cans daily total volume) with 50 ml flush before and after each can plus additional water flushes of 150 ml BID for hydration or as needed per MD (1500 kcal, 68 g protein, 1464 ml water).

## 2017-11-11 NOTE — PROGRESS NOTES
CRNA at the bedside request a change in vent settings to pressure control during procedure. Settings per CRNA.

## 2017-11-11 NOTE — ANESTHESIA RELEASE NOTE
Anesthesia Release from PACU Note    Patient: Belinda Major    Procedure(s) Performed: Procedure(s) (LRB):  ESOPHAGOGASTRODUODENOSCOPY (EGD) (N/A)    Anesthesia type: MAC    Post pain: Adequate analgesia    Post assessment: no apparent anesthetic complications, tolerated procedure well and no evidence of recall    Last Vitals:   Visit Vitals  BP (!) 146/84   Pulse (!) 59   Temp 35.9 °C (96.7 °F) (Temporal)   Resp 16   Ht 5' (1.524 m)   Wt 40.1 kg (88 lb 6.5 oz)   LMP  (LMP Unknown)   SpO2 100%   Breastfeeding? No   BMI 17.27 kg/m²       Post vital signs: stable    Level of consciousness: sedated    Nausea/Vomiting: no nausea/no vomiting    Complications: none    Airway Patency: patent    Respiratory: ETT, ventilator    Cardiovascular: stable and blood pressure at baseline    Hydration: euvolemic

## 2017-11-11 NOTE — SUBJECTIVE & OBJECTIVE
Interval History:  Undergoing endoscopy.  Episode of asymptomatic bradycardia to the 20s last shift and remained hemodynamically stable.    Review of Systems   Unable to perform ROS: Patient nonverbal (Intubated and sedated.)     Objective:     Vital Signs (Most Recent):  Temp: 96.7 °F (35.9 °C) (11/11/17 0714)  Pulse: (!) 56 (11/11/17 0858)  Resp: 16 (11/11/17 0858)  BP: (!) 169/114 (11/11/17 0832)  SpO2: 100 % (11/11/17 0858) Vital Signs (24h Range):  Temp:  [96 °F (35.6 °C)-97.4 °F (36.3 °C)] 96.7 °F (35.9 °C)  Pulse:  [35-80] 56  Resp:  [15-19] 16  SpO2:  [89 %-100 %] 100 %  BP: ()/() 169/114     Weight: 40.1 kg (88 lb 6.5 oz)  Body mass index is 17.27 kg/m².    Intake/Output Summary (Last 24 hours) at 11/11/17 0952  Last data filed at 11/11/17 0819   Gross per 24 hour   Intake           2866.7 ml   Output              675 ml   Net           2191.7 ml      Physical Exam   Constitutional: No distress.   Thin and frail   HENT:   Head: Normocephalic and atraumatic.   Mouth/Throat: Oropharynx is clear and moist.   Eyes: Conjunctivae and EOM are normal. Pupils are equal, round, and reactive to light.   Neck: Neck supple. No JVD present. No thyromegaly present.   Cardiovascular: Normal rate and regular rhythm.  Exam reveals no gallop and no friction rub.    No murmur heard.  Pulmonary/Chest: Effort normal. She has no wheezes. She has no rales.   Coarse crackles bilaterally.   Abdominal: Soft. Bowel sounds are normal. She exhibits no distension. There is no tenderness. There is no rebound and no guarding.   PEG tube in place and functioning.   Musculoskeletal: Normal range of motion. She exhibits no edema or deformity.   Lymphadenopathy:     She has no cervical adenopathy.   Neurological: She is alert. She has normal reflexes.   Skin: Skin is warm and dry. No rash noted.   Nursing note and vitals reviewed.      Significant Labs:   CBC:   Recent Labs  Lab 11/09/17  1410 11/10/17  1019 11/11/17  0625   WBC  19.17* 6.76 4.53   HGB 13.3 9.9* 10.0*   HCT 40.7 30.6* 31.6*    209 205     CMP:   Recent Labs  Lab 11/09/17  1410 11/10/17  1019 11/11/17  0840    141 143   K 4.8 3.4* 3.2*    113* 116*   CO2 20* 21* 21*    95 56*   BUN 15 9 7   CREATININE 0.7 0.6 0.6   CALCIUM 8.8 8.7 8.5*   PROT 7.4  --   --    ALBUMIN 3.3*  --   --    BILITOT 0.5  --   --    ALKPHOS 77  --   --    AST 30  --   --    ALT 15  --   --    ANIONGAP 13 7* 6*   EGFRNONAA >60.0 >60 >60       Significant Imaging: I have reviewed all pertinent imaging results/findings within the past 24 hours.

## 2017-11-11 NOTE — SUBJECTIVE & OBJECTIVE
Interval History:   No adverse , improve, awake    Review of Systems   Unable to perform ROS: Intubated       Objective:     Vital Signs (Most Recent):  Temp: 96.4 °F (35.8 °C) (11/11/17 1130)  Pulse: 85 (11/11/17 1200)  Resp: 10 (11/11/17 1200)  BP: 119/87 (11/11/17 1200)  SpO2: 100 % (11/11/17 1200) Vital Signs (24h Range):  Temp:  [96 °F (35.6 °C)-97.4 °F (36.3 °C)] 96.4 °F (35.8 °C)  Pulse:  [37-85] 85  Resp:  [10-19] 10  SpO2:  [89 %-100 %] 100 %  BP: ()/() 119/87     Weight: 40.1 kg (88 lb 6.5 oz)  Body mass index is 17.27 kg/m².      Intake/Output Summary (Last 24 hours) at 11/11/17 1233  Last data filed at 11/11/17 1100   Gross per 24 hour   Intake          2902.52 ml   Output             1055 ml   Net          1847.52 ml       Physical Exam   Constitutional: She is oriented to person, place, and time. She appears cachectic. She has a sickly appearance. She is intubated and restrained.   HENT:   Head: Atraumatic.   Eyes: Conjunctivae are normal.   Neck: Normal range of motion. Neck supple. No JVD present. No tracheal deviation present.   Cardiovascular: Regular rhythm.   No extrasystoles are present. Bradycardia present.    No murmur heard.  Pulses:       Radial pulses are 1+ on the right side, and 1+ on the left side.        Dorsalis pedis pulses are 1+ on the right side, and 1+ on the left side.   Pulmonary/Chest: She is intubated. No respiratory distress. She has decreased breath sounds. She has no wheezes. She has no rales.   Abdominal: Soft. Bowel sounds are normal. She exhibits no distension. There is no tenderness.       Has PEG   Genitourinary:   Genitourinary Comments: Has love   Musculoskeletal: She exhibits no edema.   Neurological: She is alert and oriented to person, place, and time. GCS eye subscore is 2. GCS verbal subscore is 1. GCS motor subscore is 5.   Pt is deaf and non verbal at baseline; sedated on propofol   Skin: Skin is warm and dry. Capillary refill takes 2 to 3  seconds. No cyanosis.   Cool  No skin lesions/breakdown   Nursing note and vitals reviewed.      Vents:  Vent Mode: Spont (11/11/17 1102)  Ventilator Initiated: Yes (11/09/17 1310)  Set Rate: 0 bmp (11/11/17 1102)  Vt Set: 350 mL (11/11/17 1102)  Pressure Support: 10 cmH20 (11/11/17 1102)  PEEP/CPAP: 5 cmH20 (11/11/17 1102)  Oxygen Concentration (%): 35 (11/11/17 1200)  Peak Airway Pressure: 15 cmH2O (11/11/17 1102)  Plateau Pressure: 0 cmH20 (11/11/17 1102)  Total Ve: 4.67 mL (11/11/17 1102)  F/VT Ratio<105 (RSBI): (!) 27.16 (11/11/17 1102)    Lines/Drains/Airways     Drain                 Gastrostomy/Enterostomy Percutaneous endoscopic gastrostomy (PEG) midline -- days         Urethral Catheter 11/09/17 1330 Latex 16 Fr. 1 day          Airway                 Airway - Non-Surgical Endotracheal Tube -- days         Airway - Non-Surgical 11/09/17 1300 Endotracheal Tube 1 day          Peripheral Intravenous Line                 Peripheral IV - Single Lumen 11/09/17 1258 Right Antecubital 1 day         Peripheral IV - Single Lumen 11/10/17 0705 Left Other 1 day                Significant Labs:    CBC/Anemia Profile:    Recent Labs  Lab 11/09/17  1410 11/10/17  1019 11/11/17  0625   WBC 19.17* 6.76 4.53   HGB 13.3 9.9* 10.0*   HCT 40.7 30.6* 31.6*    209 205   MCV 78* 78* 78*   RDW 16.3* 15.8* 17.0*        Chemistries:    Recent Labs  Lab 11/09/17  1410 11/10/17  1019 11/11/17  0840    141 143   K 4.8 3.4* 3.2*    113* 116*   CO2 20* 21* 21*   BUN 15 9 7   CREATININE 0.7 0.6 0.6   CALCIUM 8.8 8.7 8.5*   ALBUMIN 3.3*  --   --    PROT 7.4  --   --    BILITOT 0.5  --   --    ALKPHOS 77  --   --    ALT 15  --   --    AST 30  --   --    MG  --  1.8 1.7   PHOS  --  2.1* 3.1       ABGs:   Recent Labs  Lab 11/11/17  0458   PH 7.336*   PCO2 40.1   HCO3 21.4*   POCSATURATED 100   BE -4     Blood Culture:   Recent Labs  Lab 11/09/17  1600   LABBLOO No Growth to date  No Growth to date  No Growth to date  No  Growth to date     Lactic Acid:   Recent Labs  Lab 11/09/17  1600   LACTATE 0.7     POCT Glucose:   Recent Labs  Lab 11/10/17  1348   POCTGLUCOSE 126*     Respiratory Culture:   Recent Labs  Lab 11/10/17  1803   GSRESP <10 epithelial cells per low power field.  Rare WBC's  No organisms seen     Urine Culture:   Recent Labs  Lab 11/09/17  1605   LABURIN GRAM NEGATIVE ALEXANDRU, LACTOSE [id number] - 49,999 cfu/mlIdentification and susceptibility pending     All pertinent labs within the past 24 hours have been reviewed.    Significant Imaging:  I have reviewed and interpreted all pertinent imaging results/findings within the past 24 hours.

## 2017-11-11 NOTE — ASSESSMENT & PLAN NOTE
Recurrent hospitalization for ingestion food and aspirating on it.  Intubated for respiratory distress in the field.  Pulmonary for vent management, appreciate recs.

## 2017-11-11 NOTE — BRIEF OP NOTE
[facility] -   Brief Operative Note    SUMMARY     Surgery Date: 2017     Surgeon(s) and Role:     * Demarco Mcdermott MD - Primary    Assisting Surgeon: None    Pre-op Diagnosis:  Abnormality of esophagus [K22.9]    Post-op Diagnosis:  Post-Op Diagnosis Codes:     * Abnormality of esophagus [K22.9]    Procedure(s) (LRB):  ESOPHAGOGASTRODUODENOSCOPY (EGD) (N/A)    Anesthesia: Choice    Description of Procedure: EGD    Description of the findings of the procedure:   Large amount of mucus/saliva in the esophagus. Minimal amount of solids.  Severe esophageal stricture (2mm x 20mm) - Dilated with 6-8 then 8-10 balloon.   After balloon dilation  endoscope passed into the stomach and duodenum.  Medium sized hiatal hernia noted. Gastrostomy tube noted. Otherwise normal.    Spoke with pts sister and informed of the findings.    Estimated Blood Loss: * No values recorded between 2017 12:00 AM and 2017  8:30 AM *         Specimens:   Specimen (12h ago through future)    None

## 2017-11-11 NOTE — ASSESSMENT & PLAN NOTE
Asymptomatic; sinus on 12 lead; not sedation related; monitor with prn atropine if falls below 30 or becomes symptomatic  Now resolved

## 2017-11-11 NOTE — NURSING
Patient heart rate dropped to the 30's. BP unchanged. Np Urban aware. Holding sedation had no effect on heart rate

## 2017-11-11 NOTE — PROGRESS NOTES
[facility] -   Critical Care Medicine  Progress Note    Patient Name: Belinda Urbina  MRN: [id number]  Admission Date: 11/9/2017  Hospital Length of Stay: 2 days  Code Status: Full Code  Attending Provider: Woody Flower MD  Primary Care Provider: Santhosh Mendiola MD   Principal Problem: Acute respiratory failure    Subjective:     HPI:  42 year old female nursing home resident with PMH severe cerebral palsy, deaf, non-verbal, seizure disorder (not currently on antiepileptics), and  esophageal stricture with history of severe obstructing food bolus and aspiration; she has PEG for nutrition and meds however if food gets within her grasp she is not capable of comprehending the need for NPO status and continues to attempt to eat  She presented to [facility] ED with respiratory distress; she was intubated in ED s/t impending respiratory arrest with poor effort and unable to clear oral secretions  Further evaluation revealed leukocytosis along with CXR showing wide mediastinum with clear lung fields    Hospital/ICU Course:  Transferred to ICU with OETT on mechanical ventilation and propofol sedation    11/11: SP EGD  Meets weaning parameters  Esophagus Stretched and  dilated      Interval History:   No adverse , improve, awake    Review of Systems   Unable to perform ROS: Intubated       Objective:     Vital Signs (Most Recent):  Temp: 96.4 °F (35.8 °C) (11/11/17 1130)  Pulse: 85 (11/11/17 1200)  Resp: 10 (11/11/17 1200)  BP: 119/87 (11/11/17 1200)  SpO2: 100 % (11/11/17 1200) Vital Signs (24h Range):  Temp:  [96 °F (35.6 °C)-97.4 °F (36.3 °C)] 96.4 °F (35.8 °C)  Pulse:  [37-85] 85  Resp:  [10-19] 10  SpO2:  [89 %-100 %] 100 %  BP: ()/() 119/87     Weight: 40.1 kg (88 lb 6.5 oz)  Body mass index is 17.27 kg/m².      Intake/Output Summary (Last 24 hours) at 11/11/17 1233  Last data filed at 11/11/17 1100   Gross per 24 hour   Intake          2902.52 ml   Output             1055 ml   Net           1847.52 ml       Physical Exam   Constitutional: She is oriented to person, place, and time. She appears cachectic. She has a sickly appearance. She is intubated and restrained.   HENT:   Head: Atraumatic.   Eyes: Conjunctivae are normal.   Neck: Normal range of motion. Neck supple. No JVD present. No tracheal deviation present.   Cardiovascular: Regular rhythm.   No extrasystoles are present. Bradycardia present.    No murmur heard.  Pulses:       Radial pulses are 1+ on the right side, and 1+ on the left side.        Dorsalis pedis pulses are 1+ on the right side, and 1+ on the left side.   Pulmonary/Chest: She is intubated. No respiratory distress. She has decreased breath sounds. She has no wheezes. She has no rales.   Abdominal: Soft. Bowel sounds are normal. She exhibits no distension. There is no tenderness.       Has PEG   Genitourinary:   Genitourinary Comments: Has love   Musculoskeletal: She exhibits no edema.   Neurological: She is alert and oriented to person, place, and time. GCS eye subscore is 2. GCS verbal subscore is 1. GCS motor subscore is 5.   Pt is deaf and non verbal at baseline; sedated on propofol   Skin: Skin is warm and dry. Capillary refill takes 2 to 3 seconds. No cyanosis.   Cool  No skin lesions/breakdown   Nursing note and vitals reviewed.      Vents:  Vent Mode: Spont (11/11/17 1102)  Ventilator Initiated: Yes (11/09/17 1310)  Set Rate: 0 bmp (11/11/17 1102)  Vt Set: 350 mL (11/11/17 1102)  Pressure Support: 10 cmH20 (11/11/17 1102)  PEEP/CPAP: 5 cmH20 (11/11/17 1102)  Oxygen Concentration (%): 35 (11/11/17 1200)  Peak Airway Pressure: 15 cmH2O (11/11/17 1102)  Plateau Pressure: 0 cmH20 (11/11/17 1102)  Total Ve: 4.67 mL (11/11/17 1102)  F/VT Ratio<105 (RSBI): (!) 27.16 (11/11/17 1102)    Lines/Drains/Airways     Drain                 Gastrostomy/Enterostomy Percutaneous endoscopic gastrostomy (PEG) midline -- days         Urethral Catheter 11/09/17 1330 Latex 16 Fr. 1 day           Airway                 Airway - Non-Surgical Endotracheal Tube -- days         Airway - Non-Surgical 11/09/17 1300 Endotracheal Tube 1 day          Peripheral Intravenous Line                 Peripheral IV - Single Lumen 11/09/17 1258 Right Antecubital 1 day         Peripheral IV - Single Lumen 11/10/17 0705 Left Other 1 day                Significant Labs:    CBC/Anemia Profile:    Recent Labs  Lab 11/09/17  1410 11/10/17  1019 11/11/17  0625   WBC 19.17* 6.76 4.53   HGB 13.3 9.9* 10.0*   HCT 40.7 30.6* 31.6*    209 205   MCV 78* 78* 78*   RDW 16.3* 15.8* 17.0*        Chemistries:    Recent Labs  Lab 11/09/17  1410 11/10/17  1019 11/11/17  0840    141 143   K 4.8 3.4* 3.2*    113* 116*   CO2 20* 21* 21*   BUN 15 9 7   CREATININE 0.7 0.6 0.6   CALCIUM 8.8 8.7 8.5*   ALBUMIN 3.3*  --   --    PROT 7.4  --   --    BILITOT 0.5  --   --    ALKPHOS 77  --   --    ALT 15  --   --    AST 30  --   --    MG  --  1.8 1.7   PHOS  --  2.1* 3.1       ABGs:   Recent Labs  Lab 11/11/17  0458   PH 7.336*   PCO2 40.1   HCO3 21.4*   POCSATURATED 100   BE -4     Blood Culture:   Recent Labs  Lab 11/09/17  1600   LABBLOO No Growth to date  No Growth to date  No Growth to date  No Growth to date     Lactic Acid:   Recent Labs  Lab 11/09/17  1600   LACTATE 0.7     POCT Glucose:   Recent Labs  Lab 11/10/17  1348   POCTGLUCOSE 126*     Respiratory Culture:   Recent Labs  Lab 11/10/17  1803   GSRESP <10 epithelial cells per low power field.  Rare WBC's  No organisms seen     Urine Culture:   Recent Labs  Lab 11/09/17  1605   LABURIN GRAM NEGATIVE ALEXANDRU, LACTOSE [id number],000 - 49,999 cfu/mlIdentification and susceptibility pending     All pertinent labs within the past 24 hours have been reviewed.    Significant Imaging:  I have reviewed and interpreted all pertinent imaging results/findings within the past 24 hours.      Assessment/Plan:     Neuro    Propofol on hold for sedation vacation        Seizure  disorder    Continue dilantin        Cerebral palsy    Will restart later today Geodon, Trazadone, Zyprexa   Soft restraints         Psychiatric    Restart home benztropine via PEG        Pulmonary   * Acute respiratory failure    Was o AC mode  Neuro muscular blockers have worn off  Meets weaning criteria, fully awake  Extuabtion  VAP prophylaxis        Aspiration into respiratory tract    HOB 30 degrees  Aspiration precautions        Cardiac/Vascular   Bradycardia    Asymptomatic; sinus on 12 lead; not sedation related; monitor with prn atropine if falls below 30 or becomes symptomatic  Now resolved        Renal/    Accurate I/O; monitor creatinine        Hypokalemia    replace        ID    Sepsis surveillance; off Abx  Afebrile        Endocrine   Severe protein-calorie malnutrition    TF per RD recs to meet caloric goals     Isosource 1.5 bolus 250 ml every 6 hours (or 4 cans daily total volume) with 50 ml flush before and after each can plus additional water flushes of 150 ml BID for hydration or as needed per MD (1500 kcal, 68 g protein, 1464 ml water).          GI   Esophageal obstruction due to food impaction    SP EGD Dr Mcdermott  Esophagus stretched  Repeat EGD/EUS in 2-4 weeks           Critical Care Daily Checklist:    A: Awake: RASS Goal/Actual Goal: RASS Goal: 0-->alert and calm  Actual: [place] Agitation Sedation Scale (RASS): Drowsy   B: Spontaneous Breathing Trial Performed?     C: SAT & SBT Coordinated?  YES, done                      D: Delirium: CAM-ICU Overall CAM-ICU: Positive   E: Early Mobility Performed? Yes   F: Feeding Goal: Goals: 1. Initiate nutrition within 72 hrs. 2. Meet 85- 100 % of estimated nutritional needs without s/s of GI distress   Status: Nutrition Goal Status: new   Current Diet Order   Procedures    Diet NPO      AS: Analgesia/Sedation Sedation vacation   T: Thromboembolic Prophylaxis SCD   H: HOB > 300 Yes   U: Stress Ulcer Prophylaxis (if needed) Pepcid   G: Glucose  Control SSI   B: Bowel Function Stool Occurrence: 0   I: Indwelling Catheter (Lines & Gonzalez) Necessity PEG, Gonzalez, peripheral IV   D: De-escalation of Antimicrobials/Pharmacotherapies NONE    Plan for the day/ETD Extubate    Code Status:  Family/Goals of Care: Full Code  Return to NH     Critical Care Time: 35 minutes  Critical secondary to Patient has a condition that poses threat to life and bodily function: respiratory failure      Critical care was time spent personally by me on the following activities: development of treatment plan with patient or surrogate and bedside caregivers, discussions with consultants, evaluation of patient's response to treatment, examination of patient, ordering and performing treatments and interventions, ordering and review of laboratory studies, ordering and review of radiographic studies, pulse oximetry, re-evaluation of patient's condition. This critical care time did not overlap with that of any other provider or involve time for any procedures.     Rich Bill MD  Critical Care Medicine  [facility] -

## 2017-11-11 NOTE — ASSESSMENT & PLAN NOTE
Per report, large amount of oral secretions suctioned out.  Will continue coverage with broad spectrum IV antibiotics.  De-escalate per pulmonology, appreciate recs.  Follow up on cultures.  No evidence of pneumonia on CXR but is high risk as she did aspirate and WBC 19,000.

## 2017-11-11 NOTE — H&P (VIEW-ONLY)
[facility] -   Gastroenterology  Consult Note    Patient Name: Belinda Urbina  MRN: [id number]  Admission Date: 11/9/2017  Hospital Length of Stay: 1 days  Code Status: Full Code   Attending Provider: Susan Yang MD   Consulting Provider: Marybel Tay PA-C  Primary Care Physician: Santhosh Mendiola MD  Principal Problem:Acute respiratory failure    Inpatient consult to Gastroenterology  Consult performed by: MARYBEL TAY  Consult ordered by: KYE SANTAMARIA  Reason for consult: Food bolus        Subjective:     HPI:  The patient presented to the ER for respiratory distress requiring intubation. She has a history of cerebral palsy and lives in a nursing facility. She has a history of esophageal stricture and it managed by PEG tube. However, the patient reportedly ate chips so there was concern for aspiration. A CT scan today showed a dilated esophagus with fluid and possible mass in the distal esophagus. PEG tube in good position. Moderate constipation. We have been consulted for possible EGD. Decrease in WBC count, BUN and Hgb likely from fluids received. The patient is noted to be bradycardic.     Past Medical History:   Diagnosis Date    Anxiety     Aphasia     Cerebral palsy     Deaf     Depression     GERD (gastroesophageal reflux disease)     Insomnia     Pressure ulcer     Seizures        History reviewed. No pertinent surgical history.    Review of patient's allergies indicates:  No Known Allergies  Family History     None        Social History Main Topics    Smoking status: Unknown If Ever Smoked    Smokeless tobacco: Not on file    Alcohol use Not on file      Comment: unable to assess    Drug use: Unknown    Sexual activity: Not on file     Review of Systems   Unable to perform ROS: Intubated     Objective:     Vital Signs (Most Recent):  Temp: 96.1 °F (35.6 °C) (11/10/17 0302)  Pulse: (!) 25 (11/10/17 1200)  Resp: 16 (11/10/17 1200)  BP: 117/82 (11/10/17  0901)  SpO2: (!) 33 % (11/10/17 1200) Vital Signs (24h Range):  Temp:  [96.1 °F (35.6 °C)-96.4 °F (35.8 °C)] 96.1 °F (35.6 °C)  Pulse:  [25-90] 25  Resp:  [15-22] 16  SpO2:  [33 %-100 %] 33 %  BP: ()/() 117/82     Weight: 38.4 kg (84 lb 10.5 oz) (11/10/17 1200)  Body mass index is 16.53 kg/m².      Intake/Output Summary (Last 24 hours) at 11/10/17 1502  Last data filed at 11/10/17 0600   Gross per 24 hour   Intake          1696.36 ml   Output              875 ml   Net           821.36 ml       Lines/Drains/Airways     Drain                 Gastrostomy/Enterostomy Percutaneous endoscopic gastrostomy (PEG) midline -- days         Urethral Catheter 11/09/17 1330 Latex 16 Fr. 1 day         NG/OG Tube 11/09/17 1810 orogastric 12 Fr. Right mouth less than 1 day          Airway                 Airway - Non-Surgical 11/09/17 1300 Endotracheal Tube 1 day          Peripheral Intravenous Line                 Peripheral IV - Single Lumen 11/09/17 1258 Right Antecubital 1 day                Physical Exam   Constitutional: She appears cachectic. She is sleeping.   HENT:   Head: Normocephalic and atraumatic.   Cardiovascular: Regular rhythm.  Bradycardia present.    Pulmonary/Chest: Breath sounds normal.   intubated   Abdominal: Soft. Bowel sounds are normal. She exhibits no distension.   Musculoskeletal: She exhibits no edema.   Neurological:   Sedated        Significant Labs:  CBC:   Recent Labs  Lab 11/09/17  1410 11/10/17  1019   WBC 19.17* 6.76   HGB 13.3 9.9*   HCT 40.7 30.6*    209     CMP:   Recent Labs  Lab 11/09/17  1410 11/10/17  1019    95   CALCIUM 8.8 8.7   ALBUMIN 3.3*  --    PROT 7.4  --     141   K 4.8 3.4*   CO2 20* 21*    113*   BUN 15 9   CREATININE 0.7 0.6   ALKPHOS 77  --    ALT 15  --    AST 30  --    BILITOT 0.5  --      Coagulation: No results for input(s): INR, APTT in the last 48 hours.    Invalid input(s): PT    Significant Imaging:  Imaging results within the past  24 hours have been reviewed.    Assessment/Plan:     * Acute respiratory failure    ICU team plans to leave her intubated and sedated until we do EGD tomorrow.         Esophageal obstruction due to food impaction    Patient has established esophageal stricture. There is a question of a mass vs food bolus.   She has been bradycardiac (HR in 30s) the last couple of hours, so we will re-evaluate her in the morning for possible EGD.   NPO.   Ok to use PEG tube today if needed, but no feedings after midnight.         Bradycardia    Etiology unclear. Management per ICU team.             Thank you for your consult. I will follow-up with patient. Please contact us if you have any additional questions.    Roque Tay PA-C  Gastroenterology  [facility]

## 2017-11-11 NOTE — INTERVAL H&P NOTE
The patient has been examined and the H&P has been reviewed:    I concur with the findings and no changes have occurred since H&P was written.    Anesthesia/Surgery risks, benefits and alternative options discussed and understood by patient/family.          Active Hospital Problems    Diagnosis  POA    *Acute respiratory failure [J96.00]  Yes    Bradycardia [R00.1]  No    Esophageal obstruction due to food impaction [K22.2, T18.128A]  Yes    Seizure disorder [G40.909]  Yes     Chronic    Aspiration into respiratory tract [T17.908A]  Yes    Cerebral palsy [G80.9]  Yes    Severe protein-calorie malnutrition [E43]  Yes      Resolved Hospital Problems    Diagnosis Date Resolved POA   No resolved problems to display.

## 2017-11-11 NOTE — ASSESSMENT & PLAN NOTE
Resident of [facility].  Need to implement stricter precautions at the NH so patient cannot ingest oral food.

## 2017-11-11 NOTE — ASSESSMENT & PLAN NOTE
Was o AC mode  Neuro muscular blockers have worn off  Meets weaning criteria, fully awake  Extuabtion  VAP prophylaxis

## 2017-11-11 NOTE — PLAN OF CARE
Problem: Patient Care Overview  Goal: Plan of Care Review  Outcome: Ongoing (interventions implemented as appropriate)  Vitals signs closely monitored. Fall precautions in place. Patient turned every two hours. Pain assessed every two hours. Safety promoted. Infection risks reduced. Pt brought to CT scan

## 2017-11-11 NOTE — ASSESSMENT & PLAN NOTE
Asymptomatic and hemodynamically stable.  Atropine as needed at bedside if patient becomes symptomatic.  Suspect vagal nerve mediated.  Cardiac monitor.

## 2017-11-11 NOTE — ANESTHESIA POSTPROCEDURE EVALUATION
Anesthesia Post Evaluation    Patient: Belinda Major    Procedure(s) Performed: Procedure(s) (LRB):  ESOPHAGOGASTRODUODENOSCOPY (EGD) (N/A)    Final Anesthesia Type: MAC  Patient location during evaluation: ICU  Patient participation: No - Unable to Participate, Sedation  Level of consciousness: sedated  Post-procedure vital signs: reviewed and stable  Pain management: adequate  Airway patency: patent  PONV status at discharge: No PONV  Anesthetic complications: no      Cardiovascular status: blood pressure returned to baseline and hemodynamically stable  Respiratory status: ventilator and ETT  Hydration status: euvolemic  Follow-up not needed.        Visit Vitals  BP (!) 146/84   Pulse (!) 59   Temp 35.9 °C (96.7 °F) (Temporal)   Resp 16   Ht 5' (1.524 m)   Wt 40.1 kg (88 lb 6.5 oz)   LMP  (LMP Unknown)   SpO2 100%   Breastfeeding? No   BMI 17.27 kg/m²       Pain/Tony Score: Pain Assessment Performed: Yes (11/11/2017  6:01 AM)  Presence of Pain: denies (11/11/2017  7:16 AM)

## 2017-11-12 PROBLEM — E87.6 HYPOKALEMIA: Status: ACTIVE | Noted: 2017-11-12

## 2017-11-12 PROBLEM — J96.00 ACUTE RESPIRATORY FAILURE: Status: RESOLVED | Noted: 2017-11-09 | Resolved: 2017-11-12

## 2017-11-12 PROBLEM — K22.2 ESOPHAGEAL STRICTURE: Status: ACTIVE | Noted: 2017-11-10

## 2017-11-12 PROBLEM — R00.1 BRADYCARDIA: Status: RESOLVED | Noted: 2017-11-10 | Resolved: 2017-11-12

## 2017-11-12 LAB
ANION GAP SERPL CALC-SCNC: 6 MMOL/L
BACTERIA UR CULT: NORMAL
BASOPHILS # BLD AUTO: 0.01 K/UL
BASOPHILS NFR BLD: 0.2 %
BUN SERPL-MCNC: 7 MG/DL
CALCIUM SERPL-MCNC: 8.7 MG/DL
CHLORIDE SERPL-SCNC: 113 MMOL/L
CO2 SERPL-SCNC: 22 MMOL/L
CREAT SERPL-MCNC: 0.5 MG/DL
DIFFERENTIAL METHOD: ABNORMAL
EOSINOPHIL # BLD AUTO: 0.1 K/UL
EOSINOPHIL NFR BLD: 1.3 %
ERYTHROCYTE [DISTWIDTH] IN BLOOD BY AUTOMATED COUNT: 16.3 %
EST. GFR  (AFRICAN AMERICAN): >60 ML/MIN/1.73 M^2
EST. GFR  (NON AFRICAN AMERICAN): >60 ML/MIN/1.73 M^2
GLUCOSE SERPL-MCNC: 152 MG/DL
HCT VFR BLD AUTO: 33.9 %
HGB BLD-MCNC: 10.9 G/DL
LYMPHOCYTES # BLD AUTO: 1.1 K/UL
LYMPHOCYTES NFR BLD: 21.2 %
MCH RBC QN AUTO: 24.9 PG
MCHC RBC AUTO-ENTMCNC: 32.2 G/DL
MCV RBC AUTO: 78 FL
MONOCYTES # BLD AUTO: 0.4 K/UL
MONOCYTES NFR BLD: 6.5 %
NEUTROPHILS # BLD AUTO: 3.8 K/UL
NEUTROPHILS NFR BLD: 70.8 %
PLATELET # BLD AUTO: 221 K/UL
PMV BLD AUTO: ABNORMAL FL
POTASSIUM SERPL-SCNC: 3.9 MMOL/L
RBC # BLD AUTO: 4.37 M/UL
SODIUM SERPL-SCNC: 141 MMOL/L
WBC # BLD AUTO: 5.38 K/UL

## 2017-11-12 PROCEDURE — 99233 SBSQ HOSP IP/OBS HIGH 50: CPT | Mod: ,,, | Performed by: NURSE PRACTITIONER

## 2017-11-12 PROCEDURE — 25000003 PHARM REV CODE 250: Performed by: NURSE PRACTITIONER

## 2017-11-12 PROCEDURE — 80048 BASIC METABOLIC PNL TOTAL CA: CPT

## 2017-11-12 PROCEDURE — 25000003 PHARM REV CODE 250: Performed by: INTERNAL MEDICINE

## 2017-11-12 PROCEDURE — 63600175 PHARM REV CODE 636 W HCPCS: Performed by: INTERNAL MEDICINE

## 2017-11-12 PROCEDURE — 63700000 PHARM REV CODE 250 ALT 637 W/O HCPCS

## 2017-11-12 PROCEDURE — 63600175 PHARM REV CODE 636 W HCPCS: Performed by: EMERGENCY MEDICINE

## 2017-11-12 PROCEDURE — 63600175 PHARM REV CODE 636 W HCPCS: Performed by: NURSE PRACTITIONER

## 2017-11-12 PROCEDURE — C9113 INJ PANTOPRAZOLE SODIUM, VIA: HCPCS | Performed by: EMERGENCY MEDICINE

## 2017-11-12 PROCEDURE — 20000000 HC ICU ROOM

## 2017-11-12 PROCEDURE — 85025 COMPLETE CBC W/AUTO DIFF WBC: CPT

## 2017-11-12 PROCEDURE — 25000003 PHARM REV CODE 250: Performed by: EMERGENCY MEDICINE

## 2017-11-12 PROCEDURE — 99231 SBSQ HOSP IP/OBS SF/LOW 25: CPT | Mod: ,,, | Performed by: INTERNAL MEDICINE

## 2017-11-12 PROCEDURE — 36415 COLL VENOUS BLD VENIPUNCTURE: CPT

## 2017-11-12 RX ORDER — MAGNESIUM SULFATE HEPTAHYDRATE 40 MG/ML
2 INJECTION, SOLUTION INTRAVENOUS ONCE
Status: COMPLETED | OUTPATIENT
Start: 2017-11-12 | End: 2017-11-12

## 2017-11-12 RX ORDER — POTASSIUM CHLORIDE 20 MEQ/15ML
40 SOLUTION ORAL ONCE
Status: COMPLETED | OUTPATIENT
Start: 2017-11-12 | End: 2017-11-12

## 2017-11-12 RX ADMIN — OLANZAPINE 15 MG: 5 TABLET, FILM COATED ORAL at 08:11

## 2017-11-12 RX ADMIN — POTASSIUM CHLORIDE 40 MEQ: 20 SOLUTION ORAL at 09:11

## 2017-11-12 RX ADMIN — ENOXAPARIN SODIUM 30 MG: 100 INJECTION SUBCUTANEOUS at 05:11

## 2017-11-12 RX ADMIN — TRAZODONE HYDROCHLORIDE 100 MG: 100 TABLET ORAL at 08:11

## 2017-11-12 RX ADMIN — PHENYTOIN 100 MG: 125 SUSPENSION ORAL at 05:11

## 2017-11-12 RX ADMIN — BENZTROPINE MESYLATE 1 MG: 1 TABLET ORAL at 09:11

## 2017-11-12 RX ADMIN — PHENYTOIN 100 MG: 125 SUSPENSION ORAL at 09:11

## 2017-11-12 RX ADMIN — SODIUM CHLORIDE: 0.9 INJECTION, SOLUTION INTRAVENOUS at 05:11

## 2017-11-12 RX ADMIN — ZIPRASIDONE HYDROCHLORIDE 20 MG: 20 CAPSULE ORAL at 09:11

## 2017-11-12 RX ADMIN — ZIPRASIDONE HYDROCHLORIDE 20 MG: 20 CAPSULE ORAL at 08:11

## 2017-11-12 RX ADMIN — PHENYTOIN 100 MG: 125 SUSPENSION ORAL at 01:11

## 2017-11-12 RX ADMIN — FLUPHENAZINE HYDROCHLORIDE 10 MG: 1 TABLET, FILM COATED ORAL at 08:11

## 2017-11-12 RX ADMIN — BENZTROPINE MESYLATE 1 MG: 1 TABLET ORAL at 08:11

## 2017-11-12 RX ADMIN — PANTOPRAZOLE SODIUM 40 MG: 40 INJECTION, POWDER, FOR SOLUTION INTRAVENOUS at 09:11

## 2017-11-12 RX ADMIN — MAGNESIUM SULFATE IN WATER 2 G: 40 INJECTION, SOLUTION INTRAVENOUS at 09:11

## 2017-11-12 NOTE — PROGRESS NOTES
"[facility] Hospital Medicine  Progress Note    Patient Name: Belinda Urbina  MRN: [id number]  Patient Class: IP- Inpatient   Admission Date: 11/9/2017  Length of Stay: 3 days  Attending Physician: Woody Flower MD  Primary Care Provider: Santhosh Mendiola MD        Subjective:     Principal Problem:Acute respiratory failure    HPI:  Ms. Urbina is an unfortunate 41 y/o AA female with h/o cerebral palsy, aphasic, deafness was transferred from [facility] ED, intubated. No family around, history obtained from chart review.    Patient is a resident of [facility], has a PEG in place but continues to somehow get her hands on food and eats them. Per chart review she is supposed to be NPO at all times. She had aspirated in the past month and went into respiratory failure, was emergently intubated and was taken to [facility]. She had EGD done at that time, and had large amount of retained food removed from the esophagus. Eventually discharged back to the nursing home. Per chart review, patient had similar episode again earlier today, placed food in her mouth and aspirated. She woas found to have lot of secretions when EMS arrived. She was intubated en route to [facility], and was diverted to [facility] ED. Transferred here to [facility] ICU for further monitoring. Currently she remains intubated on propofol. No family at bedside.      Per H&P done at [facility] on 10/20/2017:  "Ms. Urbina is a 43yo F w/ a PMHx of severe cerebral palsy (deaf, non-verbal), s/p PEG, seizure disorder (previously on dilantin, no anti-epileptics on list from nursing home), and behavioral issues (prior respiratory failure after choking on food/foreign body) who was transferred from [facility] following intubation for acute hypoxemic respiratory failure. Pt was noted to be tachypneic and in distress at nursing home. She has a history of placing food in mouth even though she's not supposed to have any oral intake, and therefore, NH was " "concerned that this happened. On arrival to [facility], pt was noted to have O2 sats in 50's - 90s which corrected readily with supplemental oxygen. Pt's ABG had a pH of 7.1, CO2 of 98, O2 of 58, and HCO3 of 31. Physicians there were unable to evaluate vocal cords without sedated exam, and therefore, pt was intubated. Upon intubation, it was noted that pt had a large amount of fruit as well as aspirated tube feeds around her vocal cords. At [facility], pt was noted to have a leukocytosis of 18; however, the remainder of her labs were unremarkable. She had a CTA completed which was negative for pulmonary embolism but did revealed a patchy consolidation in the left lower lobe as well as marked esophageal dilation with irregularity at the gastroesophageal junction (seen on prior imaging as well, prior scope revealing severe esophagitis and possible gastroparesis, biopsies positive for candida unclear if ever treated for this)".    EGD done 10/22/17 at [facility]:    Esophagus: Very narrowed stricture at distal esophagus. Severe distal esophagitis. White plaque in esophagus, possibly c/w candida. Large amount of fluid and retained food (peaches). Removed with multiple passes with Adam net.    Stomach:Not examined  Duodenum: Not examined  Complications: None    Impression:  1. Severe distal esophagitis with very narrowed distal esophageal stricture.  2. Food bolus status post removal.  3. Possible candidal esophagitis.      Hospital Course:  10 November:   Intubated and sedated.  Responds to pain.  Difficulty with access. ICU NP attempting to place midline under US guidance.  ABG 7.47/33.5/216/24.9 with a pO2 100% on ventilation.  WBC 6.76<--19.   11 November:  Endoscopy at the bedside by Dr. Mcdermott showed a large amount of saliva and mucus in the esophagus and minimal solid material.  Esophageal stricture 2 mm wide by 20 mm long.  Two stage balloon dilatation followed by visualization of the stomach which was normal.    Interval " History: looks much better, comfortable. Appears at her baseline, getting TF, she can't and must not eat anything by mouth and needs repeated EGD with Dilatation periodically to allow for saliva and mucus to pass thru. She will remain dependent on TF for the rest of her life as per Dr. Mcdermott. Cant be discharged back to NH today as their DON not available today. Will continue close monitoring in the ICU.    Review of Systems   Unable to perform ROS: Patient nonverbal     Objective:     Vital Signs (Most Recent):  Temp: 97.7 °F (36.5 °C) (11/12/17 1106)  Pulse: 61 (11/12/17 1100)  Resp: 17 (11/12/17 1100)  BP: 138/88 (11/12/17 1100)  SpO2: 100 % (11/12/17 1100) Vital Signs (24h Range):  Temp:  [96.4 °F (35.8 °C)-97.8 °F (36.6 °C)] 97.7 °F (36.5 °C)  Pulse:  [43-87] 61  Resp:  [10-19] 17  SpO2:  [84 %-100 %] 100 %  BP: ()/() 138/88     Weight: 36.3 kg (80 lb 0.4 oz)  Body mass index is 15.63 kg/m².    Intake/Output Summary (Last 24 hours) at 11/12/17 1115  Last data filed at 11/12/17 0936   Gross per 24 hour   Intake             3105 ml   Output             1980 ml   Net             1125 ml      Physical Exam   Constitutional: No distress.   Thin and frail, non verbal, obviously retarded    HENT:   Head: Normocephalic and atraumatic.   Mouth/Throat: Oropharynx is clear and moist.   Eyes: Conjunctivae and EOM are normal. Pupils are equal, round, and reactive to light.   Neck: Neck supple. No JVD present. No thyromegaly present.   Cardiovascular: Normal rate and regular rhythm.  Exam reveals no gallop and no friction rub.    No murmur heard.  Pulmonary/Chest: Effort normal. She has no wheezes. She has no rales.   Coarse crackles bilaterally.   Abdominal: Soft. Bowel sounds are normal. She exhibits no distension. There is no tenderness. There is no rebound and no guarding.   PEG tube in place and functioning.   Musculoskeletal: Normal range of motion. She exhibits no edema or deformity.   Lymphadenopathy:      She has no cervical adenopathy.   Neurological: She is alert. She has normal reflexes.   Skin: Skin is warm and dry. No rash noted.   Nursing note and vitals reviewed.      Significant Labs:   BMP:   Recent Labs  Lab 11/11/17  0840 11/12/17  0738   GLU 56* 152*    141   K 3.2* 3.9   * 113*   CO2 21* 22*   BUN 7 7   CREATININE 0.6 0.5   CALCIUM 8.5* 8.7   MG 1.7  --      CBC:   Recent Labs  Lab 11/11/17  0625 11/12/17  0353   WBC 4.53 5.38   HGB 10.0* 10.9*   HCT 31.6* 33.9*    221     All pertinent labs within the past 24 hours have been reviewed.    Significant Imaging: I have reviewed all pertinent imaging results/findings within the past 24 hours.    Assessment/Plan:      * Acute respiratory failure    Recurrent hospitalization for ingestion food and aspirating on it.  Intubated for respiratory distress in the field.  Pulmonary for vent management, appreciate recs.  S/p extubation, remains extubated, s/p EGD  Doing well.          Esophageal stricture    Chronic severe Esophageal Stricture  S/p Endoscopy at the bedside by Dr. Mcdermott -- showed a large amount of saliva and mucus in the esophagus and minimal solid material.  Esophageal stricture 2 mm wide by 20 mm long.  Two stage balloon dilatation followed by visualization of the stomach which was normal.    Doing well post EGD-- cant eat anything by mouth forever but needs repeat EGD with Dilatation to help with passage of saliva and mucus  Will remain dependent of TF lifelong        Aspiration into respiratory tract    Per report, large amount of oral secretions suctioned out.  Will continue coverage with broad spectrum IV antibiotics.  De-escalate per pulmonology, appreciate recs.  Follow up on cultures.  No evidence of pneumonia on CXR but is high risk as she did aspirate and WBC 19,000.  S/p EGD-- doing well.        Cerebral palsy    Resident of [facility].  Need to implement stricter precautions at the NH so patient  cannot ingest oral food.        Seizure disorder    Monitor closely and continue home Phenytoin.  No seizure here-- stable        Severe protein-calorie malnutrition    BMI < 18.  Nutrition consult.  Continue TF        Bradycardia    Asymptomatic and hemodynamically stable.  Atropine as needed at bedside if patient becomes symptomatic.  Suspect vagal nerve mediated.  Cardiac monitor.        Hypokalemia    corrected            VTE Risk Mitigation         Ordered     enoxaparin injection 30 mg  Daily     Route:  Subcutaneous        11/09/17 2147     Medium Risk of VTE  Once      11/09/17 2111     Place sequential compression device  Until discontinued      11/09/17 2111        Condition fair to stable    Seen and d/w ICU team and Dr. Mcdermott    Critical care time spent on the evaluation and treatment of severe organ dysfunction, review of pertinent labs and imaging studies, discussions with consulting providers and discussions with patient/family: 47 minutes.    Lul Velarde MD  Department of Hospital Medicine   [facility]

## 2017-11-12 NOTE — PROGRESS NOTES
Contacted [facility] and pt's nurse at facility states they are unable to accept pt back without consent of supervision (DON) and none at facility today.  F/u tomorrow to arrange transportation.  Pt to remain in ICU until d/c

## 2017-11-12 NOTE — PROGRESS NOTES
[facility] -   Gastroenterology  Progress Note    Patient Name: Belinda Urbina  MRN: [id number]  Admission Date: 11/9/2017  Hospital Length of Stay: 3 days  Code Status: Full Code   Attending Provider: Woody Flower MD  Consulting Provider: Demarco Mcdermott MD  Primary Care Physician: Santhosh Mendiola MD  Principal Problem: Acute respiratory failure      Subjective:     Interval History: No issues after EGD with dilation. Extubated without issues. PEG tube feedings restarted.    Review of Systems   Unable to perform ROS: Patient nonverbal     Objective:     Vital Signs (Most Recent):  Temp: 97.7 °F (36.5 °C) (11/12/17 0305)  Pulse: 72 (11/12/17 0700)  Resp: 13 (11/12/17 0700)  BP: 109/73 (11/12/17 0700)  SpO2: 100 % (11/12/17 0700) Vital Signs (24h Range):  Temp:  [96.4 °F (35.8 °C)-97.8 °F (36.6 °C)] 97.7 °F (36.5 °C)  Pulse:  [43-87] 72  Resp:  [10-19] 13  SpO2:  [84 %-100 %] 100 %  BP: ()/() 109/73     Weight: 36.3 kg (80 lb 0.4 oz) (11/12/17 0535)  Body mass index is 15.63 kg/m².      Intake/Output Summary (Last 24 hours) at 11/12/17 0938  Last data filed at 11/12/17 0700   Gross per 24 hour   Intake          2880.82 ml   Output             1980 ml   Net           900.82 ml       Lines/Drains/Airways     Drain                 Gastrostomy/Enterostomy Percutaneous endoscopic gastrostomy (PEG) midline -- days         Urethral Catheter 11/09/17 1330 Latex 16 Fr. 2 days          Peripheral Intravenous Line                 Peripheral IV - Single Lumen 11/10/17 0705 Left Other 2 days                Physical Exam   Constitutional: No distress.   Eyes: No scleral icterus.   Cardiovascular: Normal rate, regular rhythm and normal heart sounds.    No murmur heard.  Pulmonary/Chest: Effort normal and breath sounds normal. She has no wheezes. She has no rales.   Abdominal: Soft. Bowel sounds are normal. She exhibits no distension. There is no hepatosplenomegaly. There is no tenderness.    Musculoskeletal: She exhibits no edema.   Contractures noted.   Psychiatric: She has a normal mood and affect. Her behavior is normal.   Nursing note and vitals reviewed.      Significant Labs:  CBC:   Recent Labs  Lab 11/10/17  1019 11/11/17  0625 11/12/17  0353   WBC 6.76 4.53 5.38   HGB 9.9* 10.0* 10.9*   HCT 30.6* 31.6* 33.9*    205 221     BMP:   Recent Labs  Lab 11/11/17  0840 11/12/17  0738   GLU 56* 152*    141   K 3.2* 3.9   * 113*   CO2 21* 22*   BUN 7 7   CREATININE 0.6 0.5   CALCIUM 8.5* 8.7   MG 1.7  --      CMP:   Recent Labs  Lab 11/12/17  0738   *   CALCIUM 8.7      K 3.9   CO2 22*   *   BUN 7   CREATININE 0.5         Significant Imaging:  No new imaging     Scheduled Meds:   benztropine  1 mg Per G Tube BID    enoxaparin  30 mg Subcutaneous Daily    fluphenazine  10 mg Per G Tube Nightly    lorazepam  2 mg Intravenous Once    magnesium sulfate IVPB  2 g Intravenous Once    OLANZapine  15 mg Per G Tube QHS    pantoprazole  40 mg Intravenous Daily    phenytoin  100 mg Per G Tube Q8H    traZODone  100 mg Per G Tube QHS    ziprasidone  20 mg Oral BID     Continuous Infusions:   PRN Meds:.acetaminophen, atropine 1 mg/ml, hydrALAZINE, ondansetron      Assessment/Plan:     Esophageal stricture    No issues after dilation. Continue NPO. Continue tube feeds. Continue PPI therapy.   Needs repeat dilation in 2 weeks. Will discuss possible EUS to evaluate esophageal thickening at that time.             Thank you for your consult. I will follow-up with patient. Please contact us if you have any additional questions.    Demarco Mcdermott MD  Gastroenterology  [facility]

## 2017-11-12 NOTE — ASSESSMENT & PLAN NOTE
Per report, large amount of oral secretions suctioned out.  Will continue coverage with broad spectrum IV antibiotics.  De-escalate per pulmonology, appreciate recs.  Follow up on cultures.  No evidence of pneumonia on CXR but is high risk as she did aspirate and WBC 19,000.  S/p EGD-- doing well.

## 2017-11-12 NOTE — PLAN OF CARE
Problem: Patient Care Overview  Goal: Plan of Care Review  Outcome: Ongoing (interventions implemented as appropriate)  No acute changes over night, VSS, tolerating RA w/ O2 sats % w/ no s/s of resp distress, SOB, or ERICKSON, afebrile, alert and following some commands after demonstration. Pt's UOP is adequate, she is tolerating her bolus TF's via PEG tube, and remains in BSWR's w/ no injuries noted. Pt turned q2hrs but maneuvers herself off of the wedge or pillow and withdraws her legs to her chest and this is where she seems to be the most comfortable. POC discussed w/ pt since no family was available, and the pt needs further reinforcement.

## 2017-11-12 NOTE — SUBJECTIVE & OBJECTIVE
Interval History: awake alert; non verbal at baseline but waving to staff and appears comfortable with VSS    Objective:     Vital Signs (Most Recent):  Temp: 97.7 °F (36.5 °C) (11/12/17 1106)  Pulse: 62 (11/12/17 1200)  Resp: 18 (11/12/17 1200)  BP: 137/87 (11/12/17 1200)  SpO2: 99 % (11/12/17 1200) Vital Signs (24h Range):  Temp:  [96.6 °F (35.9 °C)-97.8 °F (36.6 °C)] 97.7 °F (36.5 °C)  Pulse:  [43-87] 62  Resp:  [12-19] 18  SpO2:  [84 %-100 %] 99 %  BP: ()/() 137/87     Weight: 36.3 kg (80 lb 0.4 oz)  Body mass index is 15.63 kg/m².      Intake/Output Summary (Last 24 hours) at 11/12/17 1226  Last data filed at 11/12/17 0936   Gross per 24 hour   Intake             3105 ml   Output             1880 ml   Net             1225 ml       Physical Exam   Constitutional: She appears cachectic. She has a sickly appearance. She is restrained. Nasal cannula in place.   HENT:   Head: Atraumatic.   Eyes: Conjunctivae are normal.   Neck: No JVD present. No tracheal deviation present.   Cardiovascular: Normal rate and regular rhythm.   No extrasystoles are present.   No murmur heard.  Pulses:       Radial pulses are 1+ on the right side, and 1+ on the left side.        Dorsalis pedis pulses are 1+ on the right side, and 1+ on the left side.   Pulmonary/Chest: No respiratory distress. She has decreased breath sounds. She has no wheezes. She has no rales.   Abdominal: Soft. She exhibits no distension. Bowel sounds are decreased. There is no tenderness.       Musculoskeletal: She exhibits no edema.   Neurological: She is alert. GCS eye subscore is 4. GCS verbal subscore is 1. GCS motor subscore is 5.   Pt is deaf and non verbal at baseline   Skin: Skin is dry. Capillary refill takes 2 to 3 seconds. No cyanosis.   Cool  No skin lesions/breakdown           Lines/Drains/Airways     Drain                 Gastrostomy/Enterostomy Percutaneous endoscopic gastrostomy (PEG) midline -- days         Urethral Catheter 11/09/17  1330 Latex 16 Fr. 2 days          Peripheral Intravenous Line                 Peripheral IV - Single Lumen 11/10/17 0705 Left Other 2 days                Significant Labs:    CBC/Anemia Profile:    Recent Labs  Lab 11/11/17  0625 11/12/17  0353   WBC 4.53 5.38   HGB 10.0* 10.9*   HCT 31.6* 33.9*    221   MCV 78* 78*   RDW 17.0* 16.3*        Chemistries:    Recent Labs  Lab 11/11/17  0840 11/12/17  0738    141   K 3.2* 3.9   * 113*   CO2 21* 22*   BUN 7 7   CREATININE 0.6 0.5   CALCIUM 8.5* 8.7   MG 1.7  --    PHOS 3.1  --        All pertinent labs within the past 24 hours have been reviewed.    Significant Imaging:  I have reviewed all pertinent imaging results/findings within the past 24 hours.

## 2017-11-12 NOTE — ASSESSMENT & PLAN NOTE
Resident of [facility].  Need to implement stricter precautions at the NH so patient cannot ingest oral food.

## 2017-11-12 NOTE — ASSESSMENT & PLAN NOTE
No issues after dilation. Continue NPO. Continue tube feeds. Continue PPI therapy.   Needs repeat dilation in 2 weeks. Will discuss possible EUS to evaluate esophageal thickening at that time.

## 2017-11-12 NOTE — ASSESSMENT & PLAN NOTE
Chronic severe Esophageal Stricture  S/p Endoscopy at the bedside by Dr. Mcdermott -- showed a large amount of saliva and mucus in the esophagus and minimal solid material.  Esophageal stricture 2 mm wide by 20 mm long.  Two stage balloon dilatation followed by visualization of the stomach which was normal.    Doing well post EGD-- cant eat anything by mouth forever but needs repeat EGD with Dilatation to help with passage of saliva and mucus  Will remain dependent of TF lifelong

## 2017-11-12 NOTE — PLAN OF CARE
Problem: Patient Care Overview  Goal: Plan of Care Review  Outcome: Ongoing (interventions implemented as appropriate)  Pt on room air tolerating well no distress noted at this time.

## 2017-11-12 NOTE — SUBJECTIVE & OBJECTIVE
Interval History: looks much better, comfortable. Appears at her baseline, getting TF, she can't and must not eat anything by mouth and needs repeated EGD with Dilatation periodically to allow for saliva and mucus to pass thru. She will remain dependent on TF for the rest of her life as per Dr. Mcdermott. Cant be discharged back to NH today as their DON not available today. Will continue close monitoring in the ICU.    Review of Systems   Unable to perform ROS: Patient nonverbal     Objective:     Vital Signs (Most Recent):  Temp: 97.7 °F (36.5 °C) (11/12/17 1106)  Pulse: 61 (11/12/17 1100)  Resp: 17 (11/12/17 1100)  BP: 138/88 (11/12/17 1100)  SpO2: 100 % (11/12/17 1100) Vital Signs (24h Range):  Temp:  [96.4 °F (35.8 °C)-97.8 °F (36.6 °C)] 97.7 °F (36.5 °C)  Pulse:  [43-87] 61  Resp:  [10-19] 17  SpO2:  [84 %-100 %] 100 %  BP: ()/() 138/88     Weight: 36.3 kg (80 lb 0.4 oz)  Body mass index is 15.63 kg/m².    Intake/Output Summary (Last 24 hours) at 11/12/17 1115  Last data filed at 11/12/17 0936   Gross per 24 hour   Intake             3105 ml   Output             1980 ml   Net             1125 ml      Physical Exam   Constitutional: No distress.   Thin and frail, non verbal, obviously retarded    HENT:   Head: Normocephalic and atraumatic.   Mouth/Throat: Oropharynx is clear and moist.   Eyes: Conjunctivae and EOM are normal. Pupils are equal, round, and reactive to light.   Neck: Neck supple. No JVD present. No thyromegaly present.   Cardiovascular: Normal rate and regular rhythm.  Exam reveals no gallop and no friction rub.    No murmur heard.  Pulmonary/Chest: Effort normal. She has no wheezes. She has no rales.   Coarse crackles bilaterally.   Abdominal: Soft. Bowel sounds are normal. She exhibits no distension. There is no tenderness. There is no rebound and no guarding.   PEG tube in place and functioning.   Musculoskeletal: Normal range of motion. She exhibits no edema or deformity.    Lymphadenopathy:     She has no cervical adenopathy.   Neurological: She is alert. She has normal reflexes.   Skin: Skin is warm and dry. No rash noted.   Nursing note and vitals reviewed.      Significant Labs:   BMP:   Recent Labs  Lab 11/11/17  0840 11/12/17  0738   GLU 56* 152*    141   K 3.2* 3.9   * 113*   CO2 21* 22*   BUN 7 7   CREATININE 0.6 0.5   CALCIUM 8.5* 8.7   MG 1.7  --      CBC:   Recent Labs  Lab 11/11/17  0625 11/12/17  0353   WBC 4.53 5.38   HGB 10.0* 10.9*   HCT 31.6* 33.9*    221     All pertinent labs within the past 24 hours have been reviewed.    Significant Imaging: I have reviewed all pertinent imaging results/findings within the past 24 hours.

## 2017-11-12 NOTE — SUBJECTIVE & OBJECTIVE
Subjective:     Interval History: No issues after EGD with dilation. Extubated without issues. PEG tube feedings restarted.    Review of Systems   Unable to perform ROS: Patient nonverbal     Objective:     Vital Signs (Most Recent):  Temp: 97.7 °F (36.5 °C) (11/12/17 0305)  Pulse: 72 (11/12/17 0700)  Resp: 13 (11/12/17 0700)  BP: 109/73 (11/12/17 0700)  SpO2: 100 % (11/12/17 0700) Vital Signs (24h Range):  Temp:  [96.4 °F (35.8 °C)-97.8 °F (36.6 °C)] 97.7 °F (36.5 °C)  Pulse:  [43-87] 72  Resp:  [10-19] 13  SpO2:  [84 %-100 %] 100 %  BP: ()/() 109/73     Weight: 36.3 kg (80 lb 0.4 oz) (11/12/17 0535)  Body mass index is 15.63 kg/m².      Intake/Output Summary (Last 24 hours) at 11/12/17 0938  Last data filed at 11/12/17 0700   Gross per 24 hour   Intake          2880.82 ml   Output             1980 ml   Net           900.82 ml       Lines/Drains/Airways     Drain                 Gastrostomy/Enterostomy Percutaneous endoscopic gastrostomy (PEG) midline -- days         Urethral Catheter 11/09/17 1330 Latex 16 Fr. 2 days          Peripheral Intravenous Line                 Peripheral IV - Single Lumen 11/10/17 0705 Left Other 2 days                Physical Exam   Constitutional: No distress.   Eyes: No scleral icterus.   Cardiovascular: Normal rate, regular rhythm and normal heart sounds.    No murmur heard.  Pulmonary/Chest: Effort normal and breath sounds normal. She has no wheezes. She has no rales.   Abdominal: Soft. Bowel sounds are normal. She exhibits no distension. There is no hepatosplenomegaly. There is no tenderness.   Musculoskeletal: She exhibits no edema.   Contractures noted.   Psychiatric: She has a normal mood and affect. Her behavior is normal.   Nursing note and vitals reviewed.      Significant Labs:  CBC:   Recent Labs  Lab 11/10/17  1019 11/11/17  0625 11/12/17  0353   WBC 6.76 4.53 5.38   HGB 9.9* 10.0* 10.9*   HCT 30.6* 31.6* 33.9*    205 221     BMP:   Recent Labs  Lab  11/11/17  0840 11/12/17  0738   GLU 56* 152*    141   K 3.2* 3.9   * 113*   CO2 21* 22*   BUN 7 7   CREATININE 0.6 0.5   CALCIUM 8.5* 8.7   MG 1.7  --      CMP:   Recent Labs  Lab 11/12/17  0738   *   CALCIUM 8.7      K 3.9   CO2 22*   *   BUN 7   CREATININE 0.5         Significant Imaging:  No new imaging     Scheduled Meds:   benztropine  1 mg Per G Tube BID    enoxaparin  30 mg Subcutaneous Daily    fluphenazine  10 mg Per G Tube Nightly    lorazepam  2 mg Intravenous Once    magnesium sulfate IVPB  2 g Intravenous Once    OLANZapine  15 mg Per G Tube QHS    pantoprazole  40 mg Intravenous Daily    phenytoin  100 mg Per G Tube Q8H    traZODone  100 mg Per G Tube QHS    ziprasidone  20 mg Oral BID     Continuous Infusions:   PRN Meds:.acetaminophen, atropine 1 mg/ml, hydrALAZINE, ondansetron

## 2017-11-12 NOTE — ASSESSMENT & PLAN NOTE
Recurrent hospitalization for ingestion food and aspirating on it.  Intubated for respiratory distress in the field.  Pulmonary for vent management, appreciate recs.  S/p extubation, remains extubated, s/p EGD  Doing well.

## 2017-11-12 NOTE — ASSESSMENT & PLAN NOTE
No evidence of pneumonia/pneumonitis; keep HOB 30 or greater with feeding and for 30 min after; strict NPO

## 2017-11-13 DIAGNOSIS — K22.2 ESOPHAGEAL STRICTURE: Primary | ICD-10-CM

## 2017-11-13 PROBLEM — R06.89 RESPIRATORY INSUFFICIENCY: Status: ACTIVE | Noted: 2017-11-13

## 2017-11-13 LAB
BACTERIA SPEC AEROBE CULT: NORMAL
GRAM STN SPEC: NORMAL

## 2017-11-13 PROCEDURE — 63600175 PHARM REV CODE 636 W HCPCS: Performed by: INTERNAL MEDICINE

## 2017-11-13 PROCEDURE — 63700000 PHARM REV CODE 250 ALT 637 W/O HCPCS

## 2017-11-13 PROCEDURE — 25000003 PHARM REV CODE 250: Performed by: INTERNAL MEDICINE

## 2017-11-13 PROCEDURE — 63600175 PHARM REV CODE 636 W HCPCS: Performed by: EMERGENCY MEDICINE

## 2017-11-13 PROCEDURE — C9113 INJ PANTOPRAZOLE SODIUM, VIA: HCPCS | Performed by: EMERGENCY MEDICINE

## 2017-11-13 PROCEDURE — 21400001 HC TELEMETRY ROOM

## 2017-11-13 RX ADMIN — BENZTROPINE MESYLATE 1 MG: 1 TABLET ORAL at 09:11

## 2017-11-13 RX ADMIN — ZIPRASIDONE HYDROCHLORIDE 20 MG: 20 CAPSULE ORAL at 09:11

## 2017-11-13 RX ADMIN — PHENYTOIN 100 MG: 125 SUSPENSION ORAL at 05:11

## 2017-11-13 RX ADMIN — OLANZAPINE 15 MG: 5 TABLET, FILM COATED ORAL at 09:11

## 2017-11-13 RX ADMIN — PHENYTOIN 100 MG: 125 SUSPENSION ORAL at 09:11

## 2017-11-13 RX ADMIN — FLUPHENAZINE HYDROCHLORIDE 10 MG: 1 TABLET, FILM COATED ORAL at 09:11

## 2017-11-13 RX ADMIN — TRAZODONE HYDROCHLORIDE 100 MG: 100 TABLET ORAL at 09:11

## 2017-11-13 RX ADMIN — PHENYTOIN 100 MG: 125 SUSPENSION ORAL at 02:11

## 2017-11-13 RX ADMIN — ENOXAPARIN SODIUM 30 MG: 100 INJECTION SUBCUTANEOUS at 05:11

## 2017-11-13 RX ADMIN — PANTOPRAZOLE SODIUM 40 MG: 40 INJECTION, POWDER, FOR SOLUTION INTRAVENOUS at 09:11

## 2017-11-13 NOTE — NURSING
Emily from [employer] is here- pt only able to sign her name- not able to understand any other instructions or follow commands- does not sign anything but her name.

## 2017-11-13 NOTE — PLAN OF CARE
Problem: Patient Care Overview  Goal: Plan of Care Review  Outcome: Ongoing (interventions implemented as appropriate)  Pt currently has discharge orders- all PIVs and love has been removed and report has been called to nursing home- per  nursing home is refusing to take pt back- Dr Flower is to be notified per .  Pt is hemodynamically stable.  GLENDY Beltran RN  Chart check complete

## 2017-11-13 NOTE — PROGRESS NOTES
Dina at bedside to translate to pt.  Pt able to sign her name only to orientation questions.  Pt asked if she is hurting.  Pt signs her name.  According to Dina, pt mimicking Dina's signing.  Pt asked if she knows where she lives.  Pt answered with her name only.  Pt instructed that she cannot have any food by mouth because of her stricture to her esophagus.  Pt answered with her name.  No further instructions.

## 2017-11-13 NOTE — PLAN OF CARE
EDITH called [facility] to speak with the SW [facility]. CM was transferred to her CM and left a message for her to return the call. CM to f/u for safe transition.   1020 CM called to f/u with VM left . CM has been on hold for 10 minutes and transferred several times in order to speak to the appropriate patient. CM left another VM . Cm to f/u for safe transition.

## 2017-11-13 NOTE — NURSING
Dr Flower at bedside- [facility]- med tele orders to be written

## 2017-11-13 NOTE — PLAN OF CARE
Problem: Patient Care Overview  Goal: Plan of Care Review  Outcome: Ongoing (interventions implemented as appropriate)  No acute changes over night, VSS, UOP adequate, TMAX 99.4, tolerating TF's and free water boluses, and is alert and seems back to her baseline neuro status. POC discussed w/ pt since family was not present.

## 2017-11-13 NOTE — NURSING
Spoke to another nurse , Apurva Gong, regarding pt's immunization status- DON is now in another meeting with the state and nurse does not have access to the log.  Will call me when they have info.

## 2017-11-13 NOTE — NURSING
D/c orders written per MD- pt taken off of critical care monitoring.  Van transportation set up for 2pm

## 2017-11-13 NOTE — NURSING
"Spoke to Mirella at [facility]- she does not know if pt has had her pnuemonia and flu vaccines- the log is in the DON's office and she is not present.  She is to call me back when she can view log.  Also asked her if pt understands sign language- she states "magdalena"- house supervisor notified to call out [employer] [facility] for translation.  "

## 2017-11-13 NOTE — NURSING
Pt incontinent of large amt of urine through diaper onto jeans and linens- jeans removed and placed in pt belongings bag- pt cleaned- new linens and diaper applied.  Still waiting on w/c van to take [facility]-  speaking with nursing home

## 2017-11-13 NOTE — PLAN OF CARE
EDITH called and spoke with Sivakumar. She stated the nurse can call report to [phone].  CM to fax orders to [phone].  CM asked for a  Pickup time for this am. CM was told the  has an appt where an ultrasound will be done.  CM asked for  An approximate pickup time and was told  2-3 PM.  CM reported to Vamsi SHEA ICU STAFF.  1400- CM printed and faxed D/C /AVS  to [facility].   1430- CM called to confirm receipt of fax and   for the patient to transfer to NH. Cm spoke with Sivakumar and she stated the fax was in another office but she can take a report. Cm got Vamsi SHEA to give report to the nurse receiving the patient.  1550 - CM was told Transportation had no arrived for the patient. CM spoke with Jocelyne Laguna and was told she refaxed d/c orders.  CM called [facility] again and spoke with Davian and was told they have received the fax. EDITH asked what time is she coming it is going on 4pm. She placed CM on hold for 10 min. Mariah Moore ( intake ) picks up the phone and proceeds to tell me the patient will not be picked up today due to no one notifying her the patient was ready to return to the facility. EDITH informed her of all the faxes sent and times, the VM I left  For return call, report already given to Nurse Shaver and Davian just confirmed receiving the fax. The facility was called yesterday by Marin for d/c and transfer. He was told they did not have DON there on yesterday and transportation could not be setup. EDITH was told to call Monday 11/13/17 for transportation. and There is no way no one did not know or could not have informed you of her returning back to the facility. D/C orders were written by our doctor who has deemed her stable. This is an avoidable day since the facility has not returned calls for f/u and verbal report already given.  The facility knew and why you , Ms. Teresa , did not know is untruth.  EDITH asked  to speak to DON. She stated the DON was the one  "to transfer the call to her. She placed me on hold another 10 min. She came back to the phone and stated she spoke with her  and was told they cannot accept the patient until she is reassessed..  called Mary Chavis RN  ( Director of ) and reported the above.   gave her Ms. Moore number [phone]. CM to f/u for safe transition.  1635 -  and Director of  Mary Mendosa spoke with Nani [phone] via speaker phone. . She is now telling me the family is stating they feel the patient is not stable to come back to NH. They family stated they signed for EDG and without the results . The patient is TF. [facility] is only going by what the family is stating. She said the family said she is in ICU and if this happens again , how will they get her back here. The family is questioning if the patient has a tumor ?  never saw any family visit or reports of the family calling today.  re faxed all info again to [facility]. It seems that the facility just delayed the transfer without communicating to .  After speaking with Nani, the facility was going by "hear say per the family, instead of actually reading all clinical notes. Mary stated she has to return to here residency. If they are refusing to accept her back, DHS can be contacted .  CM  f/u and  fax confirmed successfully sent/received.   called Dr. Flower and informed him of all the dynamics involved with the case and the refusal of the NH to accept her back. Mary ( Dir. Of  ) asked if the patient can be converted to Med-Surg but will be allowed to stay in ICU ? Due to this unforeseen delay, which is considered an avoidable day via the NH, we have no place to safely d/c the patient.   1725 -EDITH called Nani back and held on 5 minutes.  was told their corporate nurse Stacey Bowen stated this was too much back and forth. They would need to see the patient face to face. They will have Mariah Moore and one of their facility nurses will " come out to evaluate the patient first thing tomorrow morning.   CM to f/u for safe transition.     11/13/17 1029   Discharge Reassessment   Assessment Type Discharge Planning Reassessment   Provided patient/caregiver education on the expected discharge date and the discharge plan No   Do you have any problems affording any of your prescribed medications? No   Discharge Plan A Return to nursing home   Discharge Plan B Return to Nursing Home   Patient choice form signed by patient/caregiver N/A   Can the patient answer the patient profile reliably? No, cognitively impaired;No historian available   How does the patient rate their overall health at the present time? Fair   Describe the patient's ability to walk at the present time. Minor restrictions or changes  (Need supervision)   How often would a person be available to care for the patient? Whenever needed   Number of comorbid conditions (as recorded on the chart) Four   During the past month, has the patient often been bothered by feeling down, depressed or hopeless? No   During the past month, has the patient often been bothered by little interest or pleasure in doing things? No

## 2017-11-14 VITALS
DIASTOLIC BLOOD PRESSURE: 67 MMHG | SYSTOLIC BLOOD PRESSURE: 102 MMHG | HEART RATE: 54 BPM | BODY MASS INDEX: 14.02 KG/M2 | TEMPERATURE: 98 F | HEIGHT: 60 IN | RESPIRATION RATE: 18 BRPM | WEIGHT: 71.44 LBS | OXYGEN SATURATION: 99 %

## 2017-11-14 PROCEDURE — 25000003 PHARM REV CODE 250: Performed by: INTERNAL MEDICINE

## 2017-11-14 PROCEDURE — 97803 MED NUTRITION INDIV SUBSEQ: CPT

## 2017-11-14 PROCEDURE — 63700000 PHARM REV CODE 250 ALT 637 W/O HCPCS

## 2017-11-14 RX ORDER — PANTOPRAZOLE SODIUM 40 MG/1
40 TABLET, DELAYED RELEASE ORAL DAILY
Status: DISCONTINUED | OUTPATIENT
Start: 2017-11-14 | End: 2017-11-14 | Stop reason: HOSPADM

## 2017-11-14 RX ADMIN — PHENYTOIN 100 MG: 125 SUSPENSION ORAL at 05:11

## 2017-11-14 RX ADMIN — BENZTROPINE MESYLATE 1 MG: 1 TABLET ORAL at 08:11

## 2017-11-14 RX ADMIN — ZIPRASIDONE HYDROCHLORIDE 20 MG: 20 CAPSULE ORAL at 08:11

## 2017-11-14 RX ADMIN — PANTOPRAZOLE SODIUM 40 MG: 40 TABLET, DELAYED RELEASE ORAL at 08:11

## 2017-11-14 RX ADMIN — PHENYTOIN 100 MG: 125 SUSPENSION ORAL at 02:11

## 2017-11-14 NOTE — PLAN OF CARE
CM called [facility] to get  time and the number to give report for d/c. CM was transferred to [facility]. CM left  with return contact info for return call. CM called Mariah [phone] to confirm patient return back to the facility for transportation  time. CM wanted to make sure she made all the calls to the right persons so that there will not be any delay in transferring the patient back by W/C in their van.  CM informed her  said she called and left a message for DON to return call.  CM was told per Mariah, she will contact the facility DON and notify them that I called for a  time . CM to f/u for safe transition.   1520 - CM called [facility] again and left a message for [facility] to return the call for  For transportation to  the patient.  1545- CM called the facility to speak with the person for transportation. CM was told transportation is on the way but is stuck in traffic.  CM called Yovanny and asked what  Is the number to call report . CM was told [phone] and the van should be here within the hour. CM notified Jonna the charge nurse. CM to f/u for safe transition.

## 2017-11-14 NOTE — PLAN OF CARE
4470- EDITH called Mariah Moore ( intake ) from [place] [phone].  She stated she is in traffic but is on her way here. EDITH asked if she would call when she is closer. CM to f/u for safe transition   2473- CM went to the patient's room to met Ashlyn and Candance RN from the [place]. Patient has a sitter due to her constant movement / activity that is requiring 1:1 . CM went thru all of hospital stay and progression of the patient. RN had questions regarding her TF. CM printed out all paperwork and consults. EDITH explained Nutrition Consult was based on patient's DX, and nutritional need while here. RN stated she would jump and crawl out of the bed to find something.  This time she placed her finger down her throat . She caused herself to vomit and went into resp arrest. She is always making motions to eat. Did we increase fdg?.  She has to have one person with her at all times . She is very active.EDITH stated what we placed her on is for her nutritional value. Her f/u appt with GI or her PCP needs to make that call. EDITH also asked Dr. Flower to attend as the hospital ist on this case . He felt she is hemodynamically stable for d/c.  He explained to them the patient had EDG done Sat 11/11/17. It showed stricters were dilated. He stated the patient has a f/u appt in 2 week for another EDG. At that time they will dilate and do a Biopsy. They are not sure why or what the cause of the stricture. At that time of the biospy they will r/o of there is a tumor . Presently , there is nothing at this time to prevent the strictures. EDITH explained there is nothing that an acute setting would be able to do to prevent her from doing what she had done previously to this hospital admit. She has no other medical concerns that requires an acute stay. We cannot prevent her from being hungry or having oral fixation tendencies. She is 1:1 / half-way care at the facility per RN stated. CM agreed because the  hospital had to have 1:1 care for her safety.  They are going to call their DON and give them report. CM asked what time will the patient be picked up.? CM was told they will call me but it will be today. CM to f/u with safe transition. EDITH spoke with Marielos in  and emailed to  11/13/17 an avoidable day and why.

## 2017-11-14 NOTE — PLAN OF CARE
Problem: Patient Care Overview  Goal: Plan of Care Review  Outcome: Ongoing (interventions implemented as appropriate)  Recommendations     Recommendation/Intervention: 1.Continue current tube feed regimen. 2. Will continue to monitor.   Goals: 1. Initiate nutrition within 72 hrs. 2. Meet 85- 100 % of estimated nutritional needs without s/s of GI distress   Nutrition Goal Status: goal met  Communication of RD Recs:  (care plan and sticky note)

## 2017-11-14 NOTE — PROGRESS NOTES
Placed a call to [facility] to give report to receiving nurse.  Mrs. Funk states to hold one minute for SHABBIR Shaver in charge.  Report given to SHABBIR Shaver.  All questions answered.  SHABBIR Shaver states they will arrange for pt transport.  SHABBIR Cardenas notified.

## 2017-11-14 NOTE — PROGRESS NOTES
Patient transferred to tele room 207.  Patient stable at this time.  Report given at bedside to SHABBIR Castro.

## 2017-11-14 NOTE — CARE UPDATE
MD Mundo CM and [facility] and [facility]

## 2017-11-14 NOTE — PROGRESS NOTES
"[facility] Hospital Medicine  Progress Note    Patient Name: Belinda Urbina  MRN: [id number]  Patient Class: IP- Inpatient   Admission Date: 11/9/2017  Length of Stay: 4 days  Attending Physician: Woody Flower MD  Primary Care Provider: Santhosh Mendiola MD        Subjective:     Principal Problem:Acute respiratory failure    HPI:  Ms. Urbina is an unfortunate 43 y/o AA female with h/o cerebral palsy, aphasic, deafness was transferred from [facility] ED, intubated. No family around, history obtained from chart review.    Patient is a resident of [facility], has a PEG in place but continues to somehow get her hands on food and eats them. Per chart review she is supposed to be NPO at all times. She had aspirated in the past month and went into respiratory failure, was emergently intubated and was taken to [facility]. She had EGD done at that time, and had large amount of retained food removed from the esophagus. Eventually discharged back to the nursing home. Per chart review, patient had similar episode again earlier today, placed food in her mouth and aspirated. She woas found to have lot of secretions when EMS arrived. She was intubated en route to [facility], and was diverted to [facility] ED. Transferred here to [facility] ICU for further monitoring. Currently she remains intubated on propofol. No family at bedside.      Per H&P done at [facility] on 10/20/2017:  "Ms. Urbina is a 43yo F w/ a PMHx of severe cerebral palsy (deaf, non-verbal), s/p PEG, seizure disorder (previously on dilantin, no anti-epileptics on list from nursing home), and behavioral issues (prior respiratory failure after choking on food/foreign body) who was transferred from [facility] following intubation for acute hypoxemic respiratory failure. Pt was noted to be tachypneic and in distress at nursing home. She has a history of placing food in mouth even though she's not supposed to have any oral intake, and therefore, NH was " "concerned that this happened. On arrival to [facility], pt was noted to have O2 sats in 50's - 90s which corrected readily with supplemental oxygen. Pt's ABG had a pH of 7.1, CO2 of 98, O2 of 58, and HCO3 of 31. Physicians there were unable to evaluate vocal cords without sedated exam, and therefore, pt was intubated. Upon intubation, it was noted that pt had a large amount of fruit as well as aspirated tube feeds around her vocal cords. At [facility], pt was noted to have a leukocytosis of 18; however, the remainder of her labs were unremarkable. She had a CTA completed which was negative for pulmonary embolism but did revealed a patchy consolidation in the left lower lobe as well as marked esophageal dilation with irregularity at the gastroesophageal junction (seen on prior imaging as well, prior scope revealing severe esophagitis and possible gastroparesis, biopsies positive for candida unclear if ever treated for this)".    EGD done 10/22/17 at [facility]:    Esophagus: Very narrowed stricture at distal esophagus. Severe distal esophagitis. White plaque in esophagus, possibly c/w candida. Large amount of fluid and retained food (peaches). Removed with multiple passes with Adam net.    Stomach:Not examined  Duodenum: Not examined  Complications: None    Impression:  1. Severe distal esophagitis with very narrowed distal esophageal stricture.  2. Food bolus status post removal.  3. Possible candidal esophagitis.      Hospital Course:  10 November:   Intubated and sedated.  Responds to pain.  Difficulty with access. ICU NP attempting to place midline under US guidance.  ABG 7.47/33.5/216/24.9 with a pO2 100% on ventilation.  WBC 6.76<--19.   11 November:  Endoscopy at the bedside by Dr. Mcdermott showed a large amount of saliva and mucus in the esophagus and minimal solid material.  Esophageal stricture 2 mm wide by 20 mm long.  Two stage balloon dilatation followed by visualization of the stomach which was normal.  Successfully " extubated and remained hemodynamically stable.  Arrangements for return to [facility] but the facility refused to accept her on charges that she was not stable without supporting evidence.  Plan to discharge and continue Pantoprazole.  Follow up with Gastroenterology in two weeks for repeat EGD for second dilatation and endoscopic ultrasound guided biopsy.    Interval History:  Comfortable and appears at her baseline, getting TF, she can't and must not eat anything by mouth and needs repeated EGD with Dilatation periodically to allow for saliva and mucus to pass thru. She will remain dependent on TF for the rest of her life as per Dr. Mcdermott.  Discharge planning to repeat EGD in two weeks.    Review of Systems   Unable to perform ROS: Patient nonverbal     Objective:     Vital Signs (Most Recent):  Temp: 98 °F (36.7 °C) (11/13/17 2313)  Pulse: 90 (11/13/17 2313)  Resp: 18 (11/13/17 2313)  BP: 111/66 (11/13/17 2313)  SpO2: 97 % (11/13/17 2313) Vital Signs (24h Range):  Temp:  [97.9 °F (36.6 °C)-98.8 °F (37.1 °C)] 98 °F (36.7 °C)  Pulse:  [] 90  Resp:  [13-20] 18  SpO2:  [97 %-100 %] 97 %  BP: ()/(49-83) 111/66     Weight: 38.5 kg (84 lb 14 oz)  Body mass index is 16.58 kg/m².    Intake/Output Summary (Last 24 hours) at 11/13/17 2333  Last data filed at 11/13/17 1700   Gross per 24 hour   Intake             1280 ml   Output             1615 ml   Net             -335 ml      Physical Exam   Constitutional: No distress.   Thin and frail, non verbal, obviously retarded    HENT:   Head: Normocephalic and atraumatic.   Mouth/Throat: Oropharynx is clear and moist.   Eyes: Conjunctivae and EOM are normal. Pupils are equal, round, and reactive to light.   Neck: Neck supple. No JVD present. No thyromegaly present.   Cardiovascular: Normal rate and regular rhythm.  Exam reveals no gallop and no friction rub.    No murmur heard.  Pulmonary/Chest: Effort normal. She has no wheezes. She has no rales.   Coarse  crackles bilaterally.   Abdominal: Soft. Bowel sounds are normal. She exhibits no distension. There is no tenderness. There is no rebound and no guarding.   PEG tube in place and functioning.   Musculoskeletal: Normal range of motion. She exhibits no edema or deformity.   Lymphadenopathy:     She has no cervical adenopathy.   Neurological: She is alert. She has normal reflexes.   Skin: Skin is warm and dry. No rash noted.   Nursing note and vitals reviewed.      Significant Labs:   BMP:     Recent Labs  Lab 11/12/17  0738   *      K 3.9   *   CO2 22*   BUN 7   CREATININE 0.5   CALCIUM 8.7     CBC:     Recent Labs  Lab 11/12/17  0353   WBC 5.38   HGB 10.9*   HCT 33.9*        All pertinent labs within the past 24 hours have been reviewed.    Significant Imaging: I have reviewed all pertinent imaging results/findings within the past 24 hours.    Assessment/Plan:      Hypokalemia    corrected          Seizure disorder    Monitor closely and continue home Phenytoin.  No seizure here-- stable        Esophageal stricture    Chronic severe Esophageal Stricture  S/p Endoscopy at the bedside by Dr. Mcdermott -- showed a large amount of saliva and mucus in the esophagus and minimal solid material.  Esophageal stricture 2 mm wide by 20 mm long.  Two stage balloon dilatation followed by visualization of the stomach which was normal.    Doing well post EGD-- cant eat anything by mouth forever but needs repeat EGD with Dilatation to help with passage of saliva and mucus  Will remain dependent of TF lifelong        Severe protein-calorie malnutrition    BMI < 18.  Nutrition consult.  Continue TF        Cerebral palsy    Resident of [facility].  Need to implement stricter precautions at the NH so patient cannot ingest oral food.        Aspiration into respiratory tract    Per report, large amount of oral secretions suctioned out.  Will continue coverage with broad spectrum IV antibiotics.   De-escalate per pulmonology, appreciate recs.  Follow up on cultures.  No evidence of pneumonia on CXR but is high risk as she did aspirate and WBC 19,000.  S/p EGD-- doing well.          VTE Risk Mitigation         Ordered     enoxaparin injection 30 mg  Daily     Route:  Subcutaneous        11/09/17 2147     Medium Risk of VTE  Once      11/09/17 2111     Place sequential compression device  Until discontinued      11/09/17 2111              Woody Flower MD  Department of Hospital Medicine   [facility]

## 2017-11-14 NOTE — SUBJECTIVE & OBJECTIVE
Interval History:  Comfortable and appears at her baseline, getting TF, she can't and must not eat anything by mouth and needs repeated EGD with Dilatation periodically to allow for saliva and mucus to pass thru. She will remain dependent on TF for the rest of her life as per Dr. Mcdermott.  Discharge planning to repeat EGD in two weeks.    Review of Systems   Unable to perform ROS: Patient nonverbal     Objective:     Vital Signs (Most Recent):  Temp: 98 °F (36.7 °C) (11/13/17 2313)  Pulse: 90 (11/13/17 2313)  Resp: 18 (11/13/17 2313)  BP: 111/66 (11/13/17 2313)  SpO2: 97 % (11/13/17 2313) Vital Signs (24h Range):  Temp:  [97.9 °F (36.6 °C)-98.8 °F (37.1 °C)] 98 °F (36.7 °C)  Pulse:  [] 90  Resp:  [13-20] 18  SpO2:  [97 %-100 %] 97 %  BP: ()/(49-83) 111/66     Weight: 38.5 kg (84 lb 14 oz)  Body mass index is 16.58 kg/m².    Intake/Output Summary (Last 24 hours) at 11/13/17 2333  Last data filed at 11/13/17 1700   Gross per 24 hour   Intake             1280 ml   Output             1615 ml   Net             -335 ml      Physical Exam   Constitutional: No distress.   Thin and frail, non verbal, obviously retarded    HENT:   Head: Normocephalic and atraumatic.   Mouth/Throat: Oropharynx is clear and moist.   Eyes: Conjunctivae and EOM are normal. Pupils are equal, round, and reactive to light.   Neck: Neck supple. No JVD present. No thyromegaly present.   Cardiovascular: Normal rate and regular rhythm.  Exam reveals no gallop and no friction rub.    No murmur heard.  Pulmonary/Chest: Effort normal. She has no wheezes. She has no rales.   Coarse crackles bilaterally.   Abdominal: Soft. Bowel sounds are normal. She exhibits no distension. There is no tenderness. There is no rebound and no guarding.   PEG tube in place and functioning.   Musculoskeletal: Normal range of motion. She exhibits no edema or deformity.   Lymphadenopathy:     She has no cervical adenopathy.   Neurological: She is alert. She has normal  reflexes.   Skin: Skin is warm and dry. No rash noted.   Nursing note and vitals reviewed.      Significant Labs:   BMP:     Recent Labs  Lab 11/12/17  0738   *      K 3.9   *   CO2 22*   BUN 7   CREATININE 0.5   CALCIUM 8.7     CBC:     Recent Labs  Lab 11/12/17  0353   WBC 5.38   HGB 10.9*   HCT 33.9*        All pertinent labs within the past 24 hours have been reviewed.    Significant Imaging: I have reviewed all pertinent imaging results/findings within the past 24 hours.

## 2017-11-14 NOTE — PROGRESS NOTES
[facility]   Adult Nutrition  Progress Note    SUMMARY     Recommendations    Recommendation/Intervention: 1.Continue current tube feed regimen. 2. Will continue to monitor.   Goals: 1. Initiate nutrition within 72 hrs. 2. Meet 85- 100 % of estimated nutritional needs without s/s of GI distress   Nutrition Goal Status: goal met  Communication of RD Recs:  (care plan and sticky note)      Reason for Assessment    Reason for Assessment: RD follow-up     Diagnosis:  1. Respiratory insufficiency    2. Encounter for intubation    3. Respiratory failure, unspecified chronicity, unspecified whether with hypoxia or hypercapnia    4. Abnormality of esophagus    5. Acute respiratory failure    6. Bradycardia            Past Medical History:   Diagnosis Date    Anxiety      Aphasia      Cerebral palsy      Deaf      Depression      GERD (gastroesophageal reflux disease)      Insomnia      Pressure ulcer      Seizures             General Information Comments: Pt extubated, on telemetry floor. Pt non-verbal. Tolerating TF    Nutrition Discharge Planning: Patient to receive adequate intake via PEG with tolerance , Isosource 1.5 bolus 250 ml every 6 hours (or 4 cans daily total volume) with 50 ml flush before and after each can plus additional water flushes of 150 ml BID for hydration or as needed per MD (1500 kcal, 68 g protein, 1464 ml water).      Nutrition Prescription Ordered    Current Diet Order: NPO     Current Nutrition Support Formula Ordered: Isosource 1.5  Current Nutrition Support Rate Ordered: 250 (ml)  Current Nutrition Support Frequency Ordered: Q 6 hrs        Evaluation of Received Nutrients/Fluid Intake    Enteral Calories (kcal): 1500  Enteral Protein (gm): 68        Energy Calories Required: meeting needs  % Kcal Needs: 100      Protein Required: meeting needs  % Protein Needs: 100        Intake/Output Summary (Last 24 hours) at 11/14/17 1522  Last data filed at 11/14/17 5500   Gross  per 24 hour   Intake             1200 ml   Output                0 ml   Net             1200 ml          % Intake of Estimated Energy Needs: 75 - 100 %  % Meal Intake: NPO     Nutrition Risk Screen     Nutrition Risk Screen: tube feeding or parenteral nutrition    Nutrition/Diet History       Typical Food/Fluid Intake: ALVERTO  Food Preferences: alverto     Labs/Tests/Procedures/Meds       Pertinent Labs Reviewed: reviewed  BMP  Lab Results   Component Value Date     2017    K 3.9 2017     (H) 2017    CO2 22 (L) 2017    BUN 7 2017    CREATININE 0.5 2017    CALCIUM 8.7 2017    ANIONGAP 6 (L) 2017    ESTGFRAFRICA >60 2017    EGFRNONAA >60 2017     Lab Results   Component Value Date    CALCIUM 8.7 2017    PHOS 3.1 2017     Lab Results   Component Value Date    ALBUMIN 3.3 (L) 2017       Pertinent Medications Reviewed: reviewed       Physical Findings    Overall Physical Appearance: loss of muscle mass  Tubes:  (PEG)  Oral/Mouth Cavity:  (ALVERTO)  Skin:  (Gomez 11)    Anthropometrics    Temp: 97.4 °F (36.3 °C)     Height: 5' (152.4 cm)  Weight Method: Bed Scale  Weight: 32.4 kg (71 lb 6.9 oz)     Ideal Body Weight (IBW), Female: 100 lb     % Ideal Body Weight, Female (lb): 84.66 lb  BMI (Calculated): 16.6  BMI Grade: 16 - 16.9 protein-energy malnutrition grade II     Usual Body Weight (UBW), k.9 kg     % Usual Body Weight: 94.08  % Weight Change From Usual Weight: -6.11 %             Estimated/Assessed Needs    Weight Used For Calorie Calculations: 38.4 kg (84 lb 10.5 oz)   Height (cm): 152.4 cm  Energy Need Method: kcal/kg 35 - 40 to promote wt gain      35 kcal/kg (kcal): 1344 and 40 kcal/kg (kcal): 1536   RMR (Rusk-St. Jeor Equation): 975.5   Weight Used For Protein Calculations: 38.4 kg (84 lb 10.5 oz)     1.2 gm Protein (gm): 46.18 and 1.5 gm Protein (gm): 57.6     Fluid Need Method: RDA Method (or per MD)        RDA Method  (mL): 1067               Assessment and Plan    * Acute respiratory failure-resolved as of 11/12/2017    Nutrition Problem  Inadequate energy intake    Related to (etiology):   Intubation     Signs and Symptoms (as evidenced by):    NPO, no alternative means of nutrition.     Interventions/Recommendations (treatment strategy):  See RD recs above    Nutrition Diagnosis Status:   New          Severe protein-calorie malnutrition    Nutrition Problem  Malnutrition     Related to (etiology):   Inadequate energy intake     Signs and Symptoms (as evidenced by):   Per epic records weight loss of 6 lbs within 1 month (6%), per physical assessment ~ muscle mass loss.      Interventions/Recommendations (treatment strategy):  Refer to RD recs above    Nutrition Diagnosis Status:   Continues              Monitor and Evaluation    Food and Nutrient Intake: energy intake, enteral nutrition intake  Food and Nutrient Adminstration: enteral and parenteral nutrition administration  Anthropometric Measurements: weight  Biochemical Data, Medical Tests and Procedures: electrolyte and renal panel, glucose/endocrine profile  Nutrition-Focused Physical Findings: overall appearance      Nutrition Follow-Up    RD Follow-up?: Yes (2xweekly)

## 2017-11-14 NOTE — PLAN OF CARE
Problem: Patient Care Overview  Goal: Plan of Care Review  Outcome: Ongoing (interventions implemented as appropriate)  Pt is nonverbal / deaf, no nonverbal signs of pain, Pt remains free of injury, no s/s of distress. 24 hour chart check completed. Will continue to monitor.

## 2017-11-14 NOTE — CARE UPDATE
Transport arrived for patient pickup at this time. Pt sent with papers and staff escort via wheelchair.

## 2017-11-15 ENCOUNTER — PATIENT OUTREACH (OUTPATIENT)
Dept: ADMINISTRATIVE | Facility: CLINIC | Age: 42
End: 2017-11-15

## 2017-11-15 LAB
BACTERIA BLD CULT: NORMAL
BACTERIA BLD CULT: NORMAL

## 2017-11-15 NOTE — DISCHARGE SUMMARY
"[facility] Hospital Medicine  Discharge Summary      Patient Name: Belinda Urbina  MRN: [id number]  Admission Date: 11/9/2017  Hospital Length of Stay: 5 days  Discharge Date and Time: 11/14/2017  5:20 PM  Attending Physician:  Woody Flower MD  Discharging Provider: Woody Flower MD  Primary Care Provider: Santhosh Mendiola MD      HPI:   Ms. Urbina is an unfortunate 43 y/o AA female with h/o cerebral palsy, aphasic, deafness was transferred from [facility] ED, intubated. No family around, history obtained from chart review.    Patient is a resident of [facility], has a PEG in place but continues to somehow get her hands on food and eats them. Per chart review she is supposed to be NPO at all times. She had aspirated in the past month and went into respiratory failure, was emergently intubated and was taken to [facility]. She had EGD done at that time, and had large amount of retained food removed from the esophagus. Eventually discharged back to the nursing home. Per chart review, patient had similar episode again earlier today, placed food in her mouth and aspirated. She woas found to have lot of secretions when EMS arrived. She was intubated en route to [facility], and was diverted to [facility] ED. Transferred here to [facility] for further monitoring. Currently she remains intubated on propofol. No family at bedside.      Per H&P done at [facility] on 10/20/2017:  "Ms. Urbina is a 41yo F w/ a PMHx of severe cerebral palsy (deaf, non-verbal), s/p PEG, seizure disorder (previously on dilantin, no anti-epileptics on list from nursing home), and behavioral issues (prior respiratory failure after choking on food/foreign body) who was transferred from [facility] following intubation for acute hypoxemic respiratory failure. Pt was noted to be tachypneic and in distress at nursing home. She has a history of placing food in mouth even though she's not supposed to have any oral intake, and therefore, " "NH was concerned that this happened. On arrival to [facility], pt was noted to have O2 sats in 50's - 90s which corrected readily with supplemental oxygen. Pt's ABG had a pH of 7.1, CO2 of 98, O2 of 58, and HCO3 of 31. Physicians there were unable to evaluate vocal cords without sedated exam, and therefore, pt was intubated. Upon intubation, it was noted that pt had a large amount of fruit as well as aspirated tube feeds around her vocal cords. At [facility], pt was noted to have a leukocytosis of 18; however, the remainder of her labs were unremarkable. She had a CTA completed which was negative for pulmonary embolism but did revealed a patchy consolidation in the left lower lobe as well as marked esophageal dilation with irregularity at the gastroesophageal junction (seen on prior imaging as well, prior scope revealing severe esophagitis and possible gastroparesis, biopsies positive for candida unclear if ever treated for this)".    EGD done 10/22/17 at [facility]:    Esophagus: Very narrowed stricture at distal esophagus. Severe distal esophagitis. White plaque in esophagus, possibly c/w candida. Large amount of fluid and retained food (peaches). Removed with multiple passes with Adam net.    Stomach:Not examined  Duodenum: Not examined  Complications: None    Impression:  1. Severe distal esophagitis with very narrowed distal esophageal stricture.  2. Food bolus status post removal.  3. Possible candidal esophagitis.      Procedure(s) (LRB):  ESOPHAGOGASTRODUODENOSCOPY (EGD) (N/A)      Hospital Course:   Intubated and sedated on transfer to the ICU and responds to pain.  Difficulty with access.  ICU NP attempting to place midline under US guidance.  ABG 7.47/33.5/216/24.9 with a pO2 100% on ventilation.  WBC 6.76<--19.   11 November:  Endoscopy at the bedside by Dr. Mcdermott showed a large amount of saliva and mucus in the esophagus and minimal solid material.  Esophageal stricture 2 mm wide by 20 mm long.  Two stage balloon " dilatation followed by visualization of the stomach which was normal.  Successfully extubated and remained hemodynamically stable.  Returned to baseline.  Arrangements for return to [facility] but the facility refused to accept her on charges that she was not stable without supporting evidence.  Plan to discharge and continue Pantoprazole.  Follow up with Gastroenterology in two weeks for repeat EGD for second dilatation and endoscopic ultrasound guided biopsy.  I have seen and examined Ms. Urbina on the day of discharge and deemed her appropriate to return to her nursing facility.     Consults:   Consults         Status Ordering Provider     Inpatient consult to Dietary  Once     Provider:  (Not yet assigned)    Completed MARLIN TAYLOR     Inpatient consult to Gastroenterology  Once     Provider:  Demarco Mcdermott MD    Completed KYE SANTAMARIA     Inpatient consult to Pulmonology  Once     Provider:  (Not yet assigned)    Completed RAFA MADDEN     Inpatient consult to Social Work  Once     Provider:  (Not yet assigned)    Completed MARTINA MCKEON     IP consult to dietary  Once     Provider:  (Not yet assigned)    Completed MARTINA MCKEON          No new Assessment & Plan notes have been filed under this hospital service since the last note was generated.  Service: Hospital Medicine    Final Active Diagnoses:    Diagnosis Date Noted POA    Respiratory insufficiency [R06.89] 11/13/2017 Yes    Hypokalemia [E87.6] 11/12/2017 Yes    Esophageal stricture [K22.2] 11/10/2017 Yes    Seizure disorder [G40.909] 11/10/2017 Yes     Chronic    Aspiration into respiratory tract [T17.908A] 11/09/2017 Yes    Cerebral palsy [G80.9] 11/09/2017 Yes    Severe protein-calorie malnutrition [E43] 11/09/2017 Yes      Problems Resolved During this Admission:    Diagnosis Date Noted Date Resolved POA    PRINCIPAL PROBLEM:  Acute respiratory failure [J96.00] 11/09/2017 11/12/2017 Yes    Bradycardia [R00.1]  11/10/2017 11/12/2017 No       Discharged Condition: good    Disposition: Skilled Nursing Facility    Follow Up:  Follow-up Information     Demarco Mcdermott MD In 1 week.    Specialty:  Gastroenterology  Why:  Follow up for repeat endoscopic esophageal dilatation  Contact information:  [id number] [facility] [address] [place] [id number] [phone]                 Patient Instructions:   No discharge procedures on file.    Significant Diagnostic Studies: Labs: CMP No results for input(s): NA, K, CL, CO2, GLU, BUN, CREATININE, CALCIUM, PROT, ALBUMIN, BILITOT, ALKPHOS, AST, ALT, ANIONGAP, ESTGFRAFRICA, EGFRNONAA in the last 48 hours. and CBC No results for input(s): WBC, HGB, HCT, PLT in the last 48 hours.    Pending Diagnostic Studies:     None         Medications:  Reconciled Home Medications:   Discharge Medication List as of 11/14/2017  4:58 PM      CONTINUE these medications which have NOT CHANGED    Details   arginine-glutamine-calcium HMB (DOUG) 7-7-1.5 gram PwPk 1 packet by Per G Tube route once daily., Historical Med      benztropine (COGENTIN) 1 MG tablet 1 mg by Per G Tube route 2 (two) times daily., Historical Med      fluphenazine (PROLIXIN) 10 MG tablet 10 mg by Per G Tube route once daily. , Historical Med      hydrOXYzine HCl (ATARAX) 25 MG tablet 25 mg by Per G Tube route 3 (three) times daily., Historical Med      lorazepam (ATIVAN) 1 MG tablet 1 mg by Per G Tube route every 6 (six) hours as needed for Anxiety. , Historical Med      megestrol (MEGACE) 40 MG Tab Take 40 mg by mouth once daily., Historical Med      NEOMYCIN/POLYMYXIN B/DEXAMETHA (MAXITROL OPHT) Apply 3 drops to eye 4 (four) times daily. Right eye, Historical Med      olanzapine (ZYPREXA) 10 MG tablet 15 mg by Per G Tube route every evening. , Historical Med      phenytoin (DILANTIN-125) 125 mg/5 mL suspension 4 mLs by Per G Tube route 3 (three) times daily., Historical Med      propranolol (INDERAL) 10 MG tablet 10 mg by Per G Tube  route 3 (three) times daily., Historical Med      RABEprazole (ACIPHEX) 20 mg tablet 20 mg once daily., Historical Med      ranitidine (ZANTAC) 300 MG capsule 300 mg by Per G Tube route every evening., Historical Med      trazodone (DESYREL) 100 MG tablet 100 mg by Per G Tube route every evening. , Historical Med      valsartan (DIOVAN) 80 MG tablet 80 mg by Per G Tube route once daily. , Historical Med      ziprasidone (GEODON) 20 MG Cap Take 20 mg by mouth 2 (two) times daily., Historical Med      zolpidem (AMBIEN CR) 12.5 MG CR tablet Take 12.5 mg by mouth nightly as needed for Insomnia., Historical Med             Indwelling Lines/Drains at time of discharge:   Lines/Drains/Airways     Drain                 Gastrostomy/Enterostomy Percutaneous endoscopic gastrostomy (PEG) midline -- days                Time spent on the discharge of patient: 30 minutes  Patient was seen and examined on the date of discharge and determined to be suitable for discharge.         Woody Flower MD  Department of Hospital Medicine  [facility]

## 2017-11-30 ENCOUNTER — SURGERY (OUTPATIENT)
Age: 42
End: 2017-11-30

## 2017-12-01 ENCOUNTER — HOSPITAL ENCOUNTER (OUTPATIENT)
Facility: HOSPITAL | Age: 42
Discharge: HOME | End: 2017-12-01
Attending: INTERNAL MEDICINE | Admitting: INTERNAL MEDICINE
Payer: MEDICAID

## 2017-12-01 ENCOUNTER — ANESTHESIA (OUTPATIENT)
Dept: ENDOSCOPY | Facility: HOSPITAL | Age: 42
End: 2017-12-01
Payer: MEDICAID

## 2017-12-01 ENCOUNTER — ANESTHESIA EVENT (OUTPATIENT)
Dept: ENDOSCOPY | Facility: HOSPITAL | Age: 42
End: 2017-12-01
Payer: MEDICAID

## 2017-12-01 ENCOUNTER — SURGERY (OUTPATIENT)
Age: 42
End: 2017-12-01

## 2017-12-01 VITALS
HEART RATE: 70 BPM | SYSTOLIC BLOOD PRESSURE: 103 MMHG | DIASTOLIC BLOOD PRESSURE: 69 MMHG | OXYGEN SATURATION: 100 % | RESPIRATION RATE: 18 BRPM | TEMPERATURE: 98 F

## 2017-12-01 VITALS — RESPIRATION RATE: 11 BRPM

## 2017-12-01 DIAGNOSIS — K22.2 ESOPHAGEAL STRICTURE: ICD-10-CM

## 2017-12-01 PROCEDURE — 37000008 HC ANESTHESIA 1ST 15 MINUTES: Performed by: INTERNAL MEDICINE

## 2017-12-01 PROCEDURE — 25000003 PHARM REV CODE 250: Performed by: INTERNAL MEDICINE

## 2017-12-01 PROCEDURE — 63600175 PHARM REV CODE 636 W HCPCS: Performed by: NURSE ANESTHETIST, CERTIFIED REGISTERED

## 2017-12-01 PROCEDURE — 43249 ESOPH EGD DILATION <30 MM: CPT | Performed by: INTERNAL MEDICINE

## 2017-12-01 PROCEDURE — 37000009 HC ANESTHESIA EA ADD 15 MINS: Performed by: INTERNAL MEDICINE

## 2017-12-01 PROCEDURE — 43249 ESOPH EGD DILATION <30 MM: CPT | Mod: ,,, | Performed by: INTERNAL MEDICINE

## 2017-12-01 PROCEDURE — 25000003 PHARM REV CODE 250: Performed by: NURSE ANESTHETIST, CERTIFIED REGISTERED

## 2017-12-01 PROCEDURE — C1726 CATH, BAL DIL, NON-VASCULAR: HCPCS | Performed by: INTERNAL MEDICINE

## 2017-12-01 RX ORDER — PROPOFOL 10 MG/ML
VIAL (ML) INTRAVENOUS
Status: DISCONTINUED | OUTPATIENT
Start: 2017-12-01 | End: 2017-12-01

## 2017-12-01 RX ORDER — GLYCOPYRROLATE 0.2 MG/ML
INJECTION INTRAMUSCULAR; INTRAVENOUS
Status: DISCONTINUED | OUTPATIENT
Start: 2017-12-01 | End: 2017-12-01

## 2017-12-01 RX ORDER — LANSOPRAZOLE 30 MG/1
30 TABLET, ORALLY DISINTEGRATING, DELAYED RELEASE ORAL DAILY
Qty: 30 TABLET | Refills: 11 | Status: ON HOLD | OUTPATIENT
Start: 2017-12-01 | End: 2018-02-28

## 2017-12-01 RX ORDER — MIDAZOLAM HYDROCHLORIDE 1 MG/ML
INJECTION, SOLUTION INTRAMUSCULAR; INTRAVENOUS
Status: DISCONTINUED | OUTPATIENT
Start: 2017-12-01 | End: 2017-12-01

## 2017-12-01 RX ORDER — SODIUM CHLORIDE, SODIUM LACTATE, POTASSIUM CHLORIDE, CALCIUM CHLORIDE 600; 310; 30; 20 MG/100ML; MG/100ML; MG/100ML; MG/100ML
INJECTION, SOLUTION INTRAVENOUS CONTINUOUS
Status: DISCONTINUED | OUTPATIENT
Start: 2017-12-01 | End: 2017-12-01 | Stop reason: HOSPADM

## 2017-12-01 RX ORDER — LIDOCAINE HYDROCHLORIDE 10 MG/ML
INJECTION INFILTRATION; PERINEURAL
Status: DISCONTINUED | OUTPATIENT
Start: 2017-12-01 | End: 2017-12-01

## 2017-12-01 RX ADMIN — SODIUM CHLORIDE, SODIUM LACTATE, POTASSIUM CHLORIDE, AND CALCIUM CHLORIDE: 600; 310; 30; 20 INJECTION, SOLUTION INTRAVENOUS at 09:12

## 2017-12-01 RX ADMIN — PROPOFOL 20 MG: 10 INJECTION, EMULSION INTRAVENOUS at 09:12

## 2017-12-01 RX ADMIN — SODIUM CHLORIDE, SODIUM LACTATE, POTASSIUM CHLORIDE, AND CALCIUM CHLORIDE: 600; 310; 30; 20 INJECTION, SOLUTION INTRAVENOUS at 08:12

## 2017-12-01 RX ADMIN — LIDOCAINE HYDROCHLORIDE 50 MG: 10 INJECTION, SOLUTION INFILTRATION; PERINEURAL at 09:12

## 2017-12-01 RX ADMIN — MIDAZOLAM HYDROCHLORIDE 2 MG: 1 INJECTION, SOLUTION INTRAMUSCULAR; INTRAVENOUS at 09:12

## 2017-12-01 RX ADMIN — PROPOFOL 30 MG: 10 INJECTION, EMULSION INTRAVENOUS at 09:12

## 2017-12-01 RX ADMIN — GLYCOPYRROLATE 0.2 MG: 0.2 INJECTION INTRAMUSCULAR; INTRAVENOUS at 09:12

## 2017-12-01 NOTE — ANESTHESIA PREPROCEDURE EVALUATION
12/01/2017  Belinda Urbina is a 42 y.o., female.    Anesthesia Evaluation    I have reviewed the Patient Summary Reports.    I have reviewed the Nursing Notes.   I have reviewed the Medications.     Review of Systems  Anesthesia Hx:  No previous Anesthesia  History of prior surgery of interest to airway management or planning: Denies Family Hx of Anesthesia complications.   Denies Personal Hx of Anesthesia complications.   Social:  Non-Smoker    Hematology/Oncology:         -- Anemia:   EENT/Dental:   Deaf   Cardiovascular:   ECG has been reviewed.    Pulmonary:   Respiratory Insufficiency   Hepatic/GI:   GERD    Neurological:   Seizures Cerebral Palsy    Aphasia   Psych:   Psychiatric History anxiety depression          Physical Exam  General:  Cachexia    Airway/Jaw/Neck:  Airway Findings: Mouth Opening: Normal Tongue: Normal  General Airway Assessment: Adult  Mallampati: II  TM Distance: Normal, at least 6 cm       Chest/Lungs:  Chest/Lungs Findings: Clear to auscultation     Heart/Vascular:  Heart Findings: Rate: Bradycardia             Anesthesia Plan  Type of Anesthesia, risks & benefits discussed:  Anesthesia Type:  MAC  Patient's Preference:   Intra-op Monitoring Plan: standard ASA monitors  Intra-op Monitoring Plan Comments:   Post Op Pain Control Plan:   Post Op Pain Control Plan Comments:   Induction:   IV  Beta Blocker:  Patient is not currently on a Beta-Blocker (No further documentation required).       Informed Consent: Patient representative understands risks and agrees with Anesthesia plan.  Questions answered. Anesthesia consent signed with patient representative.  ASA Score: 3     Day of Surgery Review of History & Physical:    H&P update referred to the surgeon.         Ready For Surgery From Anesthesia Perspective.

## 2017-12-01 NOTE — ANESTHESIA RELEASE NOTE
Anesthesia Release from PACU Note    Patient: Belinda Major    Procedure(s) Performed: Procedure(s) (LRB):  ESOPHAGOGASTRODUODENOSCOPY (EGD) with EUS (N/A)    Anesthesia type: MAC    Post pain: Adequate analgesia    Post assessment: no apparent anesthetic complications    Last Vitals:   Visit Vitals  BP 98/60   Pulse 71   Temp 36.6 °C (97.8 °F) (Axillary)   Resp 16   LMP  (LMP Unknown)   SpO2 100%   Breastfeeding? No       Post vital signs: stable    Level of consciousness: awake    Nausea/Vomiting: no nausea/no vomiting    Complications: none    Airway Patency: patent    Respiratory: unassisted    Cardiovascular: stable and blood pressure at baseline    Hydration: euvolemic

## 2017-12-01 NOTE — DISCHARGE INSTRUCTIONS
Esophagitis     With esophagitis, the lining of the esophagus is inflamed.   Do you often have burning pain in your chest? You may have esophagitis. This is when the lining of the esophagus becomes red and swollen (inflamed). The esophagus is the tube that connects your throat to your stomach. This sheet tells you more about esophagitis. It also explains your treatment options.  Main types of esophagitis  Reflux esophagitis. This is the more common type. It is caused by GERD (gastroesophageal reflux disease). Stomach contents with stomach acid flow back up into the esophagus. This happens over and over. It leads to inflammation. Risk factors can include:  · Being overweight  · Asthma  · Smoking  · Pregnancy  · Frequent vomiting  · Certain medicines (such as aspirin and other anti-inflammatories)  · Hiatal hernia  Infectious esophagitis. This is caused by an infection. You are more at risk for this if you have a weakened immune system and poor nutrition. Antibiotic use can also be a factor. The infection is often due to the following:  · A type of fungus (typically candida)  · A virus, such as herpes simplex virus 1 (HSV-1) or cytomegalovirus (CMV)  Eosinophilic esophagitis. Foods or other things around you can give you an allergic reaction. This triggers an immune response and leads to esophagitis.  Pill-induced esophagitis. Certain types of medicines can cause inflammation and ulcers in the esophagus. These include doxycycline, aspirin, NSAIDs, alendronate, potassium, quinidine, iron.  Symptoms of esophagitis  The following symptoms can occur with esophagitis:  · Pain when swallowing, or trouble swallowing  · Pain behind your breastbone (heartburn)  · Acid regurgitation  · Chronic sore throat  · Gum Inflammation  · Cavities  · Bad breath  · Nausea  · Pain in your upper belly (abdomen)  · Bleeding (indicated by bright red vomit or black, tarry stool)  These symptoms occur more often with reflux  esophagitis:  · Coughing, wheezing, or asthma  · Hoarseness  Diagnosis of esophagitis  Your healthcare provider will ask about your health history and symptoms. Youll also be examined. Sometimes certain tests are needed. These may include:  · Upper endoscopy. A thin, flexible tube with a tiny light and camera is used. It is inserted through the mouth down into the esophagus. This lets the provider look for damage. A small sample of tissue (biopsy) may also be removed. The sample is sent to a lab for testing.  · Upper GI X-ray with barium. An X-ray is done after you drink a substance called barium. Barium may make problems in the esophagus easier to see on an x-ray.  · Esophageal pH. A soft, thin tube is passed into the esophagus through the nose or mouth for 24 hours. It measures the acid level in the esophagus.  · Esophageal manometry. A soft, thin tube is passed into the esophagus through the nose or mouth. It measures muscle contractions in the esophagus.  Treatment of esophagitis  Medicines. Different medicines can help treat esophagitis. The medicine used will depend on the type of esophagitis you have. Talk with your healthcare provider.  Lifestyle changes. Making the following changes can help reduce irritation and ease your symptoms:  · Avoid spicy foods (pepper, chili powder, pavon). Also avoid hard foods (nuts, crackers, raw vegetables) and acidic foods and drinks (tomatoes, citrus fruits and juices). Other problem foods include chocolate, peppermint, nutmeg, and foods high in fat.  · Until you can swallow without pain, follow a combined liquid and soft diet. Try foods such as cooked cereals, mashed potatoes, and soups.  · Take small bites and chew your food thoroughly.  · Avoid large meals and heavy evening meals. Don't lie down within 2 to 3 hours of eating.  · Get to or stay at a healthy weight.  · Avoid alcohol, caffeine, and smoking or tobacco products.  · Brush and floss your teeth  · Raise your  upper body by 4 to 6 inches when lying in bed. This can be done using a foam wedge. Or put blocks under the legs at the head of your bed.  Surgery. This may be needed for severe reflux esophagitis. Other noninvasive procedures to treat GERD and esophagitis are being studied. Your provider can tell you more.  Why treatment Is important  Without treatment, esophagitis can get worse. This is especially true with severe reflux esophagitis. For instance, continued symptoms can cause scarring of the esophagus. Over time, this can cause a narrowing the esophagus (stricture). This can make it hard to pass food down to the stomach. As symptoms go on they can also cause changes in the lining of the esophagus. These changes can put you at a slightly higher risk of cancer of the esophagus.   Date Last Reviewed: 7/1/2016 © 2000-2017 [employer] [address] [place] [address]. All rights reserved. This information is not intended as a substitute for professional medical care. Always follow your healthcare professional's instructions.        Esophageal Dilation     A balloon dilator may be used to widen a stricture in the esophagus.   An esophageal dilation is a procedure used to widen a narrowed section of your esophagus. This is the tube that leads from your throat to your stomach. Narrowing (stricture) of the esophagus can cause problems. These include trouble swallowing. This sheet explains what to expect with esophageal dilation.  Why esophageal dilation is needed  Several problems can be treated with esophageal dilation. They include:  · Peptic stricture. This is caused by reflux esophagitis. With this problem, the esophagus is irritated by acid reflux (heartburn). This occurs when acid from your stomach flows back up into the esophagus. Stomach acid damages the lining of the esophagus. This leads to a buildup of scar tissue. As a result, the esophagus is narrowed.  · Schatzkis ring. This is an  abnormal ring of tissue. It forms where the esophagus meets the stomach. It can cause trouble swallowing. It can also cause food to get stuck in the esophagus. The cause of this condition is not known.  · Achalasia. This condition stops food and liquids from moving into your stomach from the esophagus. It affects the lower esophageal sphincter (LES). The LES is a muscular ring that opens (relaxes) when you swallow. With achalasia, the LES does not relax. This condition can also cause problems with peristalsis. This is the normal muscular action of the esophagus that moves food into the stomach.  · Eosinophilic esophagitis. This is a redness and swelling (inflammation) in the esophagus. It is caused by an environmental trigger such as a food allergy. It can lead to pain, trouble swallowing, and strictures.  · Less common causes of stricture. Other causes of stricture include radiation treatment and cancer.  Before you have esophageal dilation  · Tell your provider about any medicines you take. This includes prescription medicines, over-the-counter medicines, herbs, vitamins, and other supplements. Be sure to mention aspirin or any blood thinners youre taking.  · Let your provider know if you need to take antibiotics before dental procedures. You may need to take them before esophageal dilation as well.  · Tell your provider about any health conditions you have, such as heart or lung disease. Also mention any allergies to medicines.  · Youll need to have an empty stomach for the procedure. Follow your providers instructions for not eating and drinking before the procedure.  · Arrange to have a family member or friend drive you home after the procedure.  During the procedure  · You may be given local anesthesia to numb your throat. Youll also likely be given medicine to relax you. The procedure takes about 15 minutes. It does not cause trouble breathing.  · A tube called an endoscope (scope) is used. This is a  narrow tube with a tiny light and camera at the end. The scope is inserted through your mouth and into your esophagus. It lets your provider see inside your esophagus. To help guide your provider, an imaging method called fluoroscopy may also be used. This creates a moving X-ray image on a computer screen.   · Next, special tiny tools are carefully guided through your mouth and down into the esophagus. They widen the stricture and are then removed. Different types of instruments are used. The type used depends on the size and cause of the stricture. Types include:  ¨ Balloon dilator. A tiny empty balloon is put into the stricture using an endoscope. The balloon is slowly filled with air. The air is removed from the balloon when the stricture is widened to the right size. Balloon dilators are used to treat many types of strictures.  ¨ Guided wire dilator. A thin wire is eased down the esophagus. A small tube thats wider on one end is guided down the wire. It is put into the stricture to stretch it. These dilators are used to treat all kinds of strictures.  ¨ Bougies. These are weighted, cone-shaped tubes. Starting with smaller cones, your provider uses increasingly larger cones until the stricture is stretched the right amount. Bougies are often used to treat strictures that are simple (short, straight, and not very narrow).  After the procedure  · Youll be watched closely until your provider says youre ready to go home. Youll need to have a friend or family member drive you home.  · You may have a sore throat for the rest of the day.  · You may have pain behind your breastbone for a short time afterwards.  · You can start drinking fluids again after the numbness in your throat goes away. You can resume eating the same day or the next day.  Risks and possible complications  Risks and possible complications for esophageal dilation include:  · Infection  · A tear or hole in the esophagus lining, causing bleeding  and possibly needing surgery to fix  · Risks of anesthesia  Follow-up  You may need to have the procedure repeated one or more times. This depends on the cause and extent of the narrowing. Repeat procedures can allow the dilation to take place more slowly. This reduces the risks of the procedure.  If your stricture was caused by reflux esophagitis, youll likely need to take medicine to treat that condition. Your provider will tell you more.  When to call your provider  Call your healthcare provider right away if you have any of the following after the procedure:  · Fever of 100.4°F (38.0°C)  · Chest pain  · Trouble swallowing  · Vomiting blood or material that looks like coffee grounds  · Bleeding  · Black, tarry, or bloody stools   Date Last Reviewed: 7/1/2016  © 1802-1173 [employer]. [address] [place] [address], [place] [address]. All rights reserved. This information is not intended as a substitute for professional medical care. Always follow your healthcare professional's instructions.

## 2017-12-01 NOTE — PLAN OF CARE
Dr Moore came to bedside and discussed findings. NO N/V,  no abdominal pain, no GI bleeding, and vitals stable.  Pt discharged from unit.

## 2017-12-01 NOTE — ANESTHESIA POSTPROCEDURE EVALUATION
Anesthesia Post Evaluation    Patient: Belinda Major    Procedure(s) Performed: Procedure(s) (LRB):  ESOPHAGOGASTRODUODENOSCOPY (EGD) with EUS (N/A)    Final Anesthesia Type: MAC  Patient location during evaluation: PACU  Patient participation: Yes- Able to Participate  Level of consciousness: awake and alert  Post-procedure vital signs: reviewed and stable  Pain management: adequate  Airway patency: patent  PONV status at discharge: No PONV  Anesthetic complications: no      Cardiovascular status: blood pressure returned to baseline  Respiratory status: unassisted  Hydration status: euvolemic  Follow-up not needed.        Visit Vitals  BP 98/60   Pulse 71   Temp 36.6 °C (97.8 °F) (Axillary)   Resp 16   LMP  (LMP Unknown)   SpO2 100%   Breastfeeding? No       Pain/Tony Score: Pain Assessment Performed: Yes (12/1/2017  9:35 AM)  Presence of Pain: denies (12/1/2017  9:35 AM)  Tony Score: 9 (12/1/2017  9:35 AM)

## 2017-12-01 NOTE — TRANSFER OF CARE
Anesthesia Transfer of Care Note    Patient: Belinda Major    Procedure(s) Performed: Procedure(s) (LRB):  ESOPHAGOGASTRODUODENOSCOPY (EGD) with EUS (N/A)    Patient location: PACU    Anesthesia Type: MAC    Transport from OR: Transported from OR on room air with adequate spontaneous ventilation    Post pain: adequate analgesia    Post assessment: no apparent anesthetic complications    Post vital signs: stable    Level of consciousness: awake    Nausea/Vomiting: no nausea/vomiting    Complications: none    Transfer of care protocol was followed      Last vitals:   Visit Vitals  BP 98/60   Pulse 71   Temp 36.6 °C (97.8 °F) (Axillary)   Resp 16   LMP  (LMP Unknown)   SpO2 100%   Breastfeeding? No

## 2017-12-01 NOTE — H&P
Short Stay Endoscopy History and Physical    PCP - Santhosh Mendiola MD    Procedure - EGD  ASA - III  Mallampati - per anesthesia  History of Anesthesia problems - no  Family history Anesthesia problems -  no     HPI:    Reason for EGD: Esophageal stricture    H/o aspiration  Questionable esophageal mass, none noted on EGD      Medical History:   Past Medical History:   Diagnosis Date    Anxiety     Aphasia     Cerebral palsy     Deaf     Depression     GERD (gastroesophageal reflux disease)     Insomnia     Pressure ulcer     Seizures        Surgical History:   History reviewed. No pertinent surgical history.    Family History:  History reviewed. No pertinent family history.    Social History:   Social History   Substance Use Topics    Smoking status: Unknown If Ever Smoked    Smokeless tobacco: Not on file    Alcohol use Not on file      Comment: unable to assess       Allergies:   Review of patient's allergies indicates:  No Known Allergies    Medications:   Current Facility-Administered Medications on File Prior to Encounter   Medication Dose Route Frequency Provider Last Rate Last Dose    [DISCONTINUED] lactated ringers infusion   Intravenous Continuous Jalil Moore MD         Current Outpatient Prescriptions on File Prior to Encounter   Medication Sig Dispense Refill    arginine-glutamine-calcium HMB (DOUG) 7-7-1.5 gram PwPk 1 packet by Per G Tube route once daily.      benztropine (COGENTIN) 1 MG tablet 1 mg by Per G Tube route 2 (two) times daily.      fluphenazine (PROLIXIN) 10 MG tablet 10 mg by Per G Tube route once daily.       hydrOXYzine HCl (ATARAX) 25 MG tablet 25 mg by Per G Tube route 3 (three) times daily.      lorazepam (ATIVAN) 1 MG tablet 1 mg by Per G Tube route every 6 (six) hours as needed for Anxiety.       megestrol (MEGACE) 40 MG Tab Take 40 mg by mouth once daily.      NEOMYCIN/POLYMYXIN B/DEXAMETHA (MAXITROL OPHT) Apply 3 drops to eye 4 (four) times  daily. Right eye      olanzapine (ZYPREXA) 10 MG tablet 15 mg by Per G Tube route every evening.       phenytoin (DILANTIN-125) 125 mg/5 mL suspension 4 mLs by Per G Tube route 3 (three) times daily.      propranolol (INDERAL) 10 MG tablet 10 mg by Per G Tube route 3 (three) times daily.      RABEprazole (ACIPHEX) 20 mg tablet 20 mg once daily.      ranitidine (ZANTAC) 300 MG capsule 300 mg by Per G Tube route every evening.      trazodone (DESYREL) 100 MG tablet 100 mg by Per G Tube route every evening.       valsartan (DIOVAN) 80 MG tablet 80 mg by Per G Tube route once daily.       ziprasidone (GEODON) 20 MG Cap Take 20 mg by mouth 2 (two) times daily.      zolpidem (AMBIEN CR) 12.5 MG CR tablet Take 12.5 mg by mouth nightly as needed for Insomnia.         Physical Exam:  Vital Signs:   Vitals:    12/01/17 0836   BP: 109/76   Pulse: (!) 58   Resp: (!) 22   Temp: 97.8 °F (36.6 °C)     General Appearance: Well appearing in no acute distress  ENT: OP clear  Chest: CTA B  CV: RRR, no m/r/g  Abd: s/nt/nd/nabs  Ext: no edema    Labs:  Lab Results   Component Value Date    WBC 5.38 11/12/2017    HGB 10.9 (L) 11/12/2017    HCT 33.9 (L) 11/12/2017    MCV 78 (L) 11/12/2017     11/12/2017     No results found for: INR, PROTIME  No results found for: IRON, TIBC, FERRITIN, SATURATEDIRO      Assessment:  Patient Active Problem List   Diagnosis    Aspiration into respiratory tract    Cerebral palsy    Severe protein-calorie malnutrition    Esophageal stricture    Seizure disorder    Hypokalemia    Respiratory insufficiency       Will re-dilate stricture. If able to get EUS through will evaluate or else will need CT with contrast.    Plan:  I have explained the risks and benefits of endoscopy procedures to the sister including but not limited to bleeding, perforation, infection, and death. The sister wishes to proceed.

## 2017-12-10 ENCOUNTER — HOSPITAL ENCOUNTER (EMERGENCY)
Facility: HOSPITAL | Age: 42
Discharge: HOME | End: 2017-12-10
Attending: INTERNAL MEDICINE
Payer: MEDICAID

## 2017-12-10 VITALS
SYSTOLIC BLOOD PRESSURE: 105 MMHG | DIASTOLIC BLOOD PRESSURE: 77 MMHG | RESPIRATION RATE: 18 BRPM | TEMPERATURE: 99 F | HEART RATE: 103 BPM | OXYGEN SATURATION: 100 %

## 2017-12-10 DIAGNOSIS — F05 POST-ICTAL CONFUSION: Primary | ICD-10-CM

## 2017-12-10 DIAGNOSIS — R53.83 LETHARGY: ICD-10-CM

## 2017-12-10 LAB
BILIRUB UR QL STRIP: NEGATIVE
CLARITY UR: CLEAR
COLOR UR: YELLOW
GLUCOSE UR QL STRIP: NEGATIVE
HGB UR QL STRIP: ABNORMAL
KETONES UR QL STRIP: NEGATIVE
LEUKOCYTE ESTERASE UR QL STRIP: NEGATIVE
NITRITE UR QL STRIP: NEGATIVE
PH UR STRIP: 6 [PH] (ref 5–8)
PROT UR QL STRIP: NEGATIVE
SP GR UR STRIP: 1.01 (ref 1–1.03)
URN SPEC COLLECT METH UR: ABNORMAL
UROBILINOGEN UR STRIP-ACNC: NEGATIVE EU/DL

## 2017-12-10 PROCEDURE — 81003 URINALYSIS AUTO W/O SCOPE: CPT

## 2017-12-10 PROCEDURE — 99284 EMERGENCY DEPT VISIT MOD MDM: CPT

## 2017-12-10 RX ORDER — PHENYTOIN 125 MG/5ML
4 SUSPENSION ORAL 3 TIMES DAILY
Qty: 237 ML | Refills: 3 | Status: SHIPPED | OUTPATIENT
Start: 2017-12-10

## 2017-12-10 NOTE — ED PROVIDER NOTES
SCRIBE #1 NOTE: I, Ghazala Robles, am scribing for, and in the presence of, Lisa Sigala MD. I have scribed the entire note.      History      Chief Complaint   Patient presents with    Altered Mental Status     sent from [facility] with fever and AMS.  pt is baseline nonverbal       Review of patient's allergies indicates:  No Known Allergies     HPI   HPI    12/10/2017, 5:33 PM   History obtained from the nursing home      History of Present Illness: Belinda Urbina is a 42 y.o. female patient with medical hx of cerebral palsy and seizures who presents to the Emergency Department for lethargy which onset gradually earlier today. Symptoms are constant and moderate in severity. Per nursing home pt has been lethargic. Pt is usually more verbal, however she has not been speaking. Pt is currently not speaking. Pt is able to follow simple instruction. No further complaints or concerns made at this time.       Arrival mode: EMS    PCP: Santhosh Mendiola MD       Past Medical History:  Past Medical History:   Diagnosis Date    Anxiety     Aphasia     Cerebral palsy     Deaf     Depression     GERD (gastroesophageal reflux disease)     Insomnia     Pressure ulcer     Seizures        Past Surgical History:  No past surgical history on file.      Family History:  No family history on file.    Social History:  Social History     Social History Main Topics    Smoking status: Unknown If Ever Smoked    Smokeless tobacco: Not on file    Alcohol use Not on file      Comment: unable to assess    Drug use: Unknown    Sexual activity: Not on file       ROS   Review of Systems   Unable to perform ROS: Other       Physical Exam      Initial Vitals   BP Pulse Resp Temp SpO2   12/10/17 1649 12/10/17 1649 12/10/17 1649 12/10/17 1657 12/10/17 1649   (!) 133/90 (!) 125 (!) 22 98.7 °F (37.1 °C) 98 %      MAP       12/10/17 1649       104.33          Physical Exam  Nursing Notes and Vital Signs  Reviewed.  Constitutional: Patient is in no apparent distress. Baseline.  Head: Atraumatic. Normocephalic.  Eyes: PERRL. EOM intact. Conjunctivae are not pale. No scleral icterus.  ENT: Mucous membranes are moist. Oropharynx is clear and symmetric.    Neck: Supple. Full ROM. No lymphadenopathy.  Cardiovascular: Regular rate. Regular rhythm. No murmurs, rubs, or gallops. Distal pulses are 2+ and symmetric.  Pulmonary/Chest: No respiratory distress. Clear to auscultation bilaterally. No wheezing or rales.  Abdominal: peg tube in place.   Musculoskeletal: Obvious deformities to all extremities (baseline)  Skin: Warm and dry.  Neurological:  Awake and appropriate. Baseline. No acute neurological deficits are appreciated.      ED Course    Procedures  ED Vital Signs:  Vitals:    12/10/17 1649 12/10/17 1657 12/10/17 1749 12/10/17 1914   BP: (!) 133/90  117/88 107/63   Pulse: (!) 125  107 96   Resp: (!) 22  16 15   Temp:  98.7 °F (37.1 °C)     TempSrc:  Oral     SpO2: 98%  100% 100%    12/10/17 1944   BP: 105/77   Pulse: 103   Resp: 18   Temp:    TempSrc:    SpO2: 100%       Abnormal Lab Results:  Labs Reviewed   URINALYSIS - Abnormal; Notable for the following:        Result Value    Occult Blood UA Trace (*)     All other components within normal limits        All Lab Results:  Results for orders placed or performed during the hospital encounter of 12/10/17   Urinalysis   Result Value Ref Range    Specimen UA Urine, Clean Catch     Color, UA Yellow Yellow, Straw, Dara    Appearance, UA Clear Clear    pH, UA 6.0 5.0 - 8.0    Specific Gravity, UA 1.010 1.005 - 1.030    Protein, UA Negative Negative    Glucose, UA Negative Negative    Ketones, UA Negative Negative    Bilirubin (UA) Negative Negative    Occult Blood UA Trace (A) Negative    Nitrite, UA Negative Negative    Urobilinogen, UA Negative <2.0 EU/dL    Leukocytes, UA Negative Negative         Imaging Results:  Imaging Results          X-Ray Chest 1 View (Final  result)  Result time 12/10/17 17:27:49    Final result by Scarlet Ratliff MD (12/10/17 17:27:49)                 Impression:     No acute cardiopulmonary disease.      Electronically signed by: SCARLET RATLIFF MD  Date:     12/10/17  Time:    17:27              Narrative:    Exam: Portable chest xray    History:    Malaise    Findings:     The heart is normal and the lungs are clear.  Comparison 11/11/2017.                                      The Emergency Provider reviewed the vital signs and test results, which are outlined above.    ED Discussion       6:24 PM: Pt's nursing home called stating that they were also worried about pt's peg tube content being at 400. Peg tube content is currently at 100. Nursing home notes that pt was not started on Dilantin per her last visit, so she will be started on it today. Will start dilantin at previous dose 4 mL 3x a day.     6:26 PM: Reassessed pt at this time. Pt is back at baseline mental status. All questions and concerns were addressed at this time with pt's nursing home. They expresses understanding of information and instructions, and is comfortable with plan to discharge. Instructed to allow pt to follow up with her PCP. Pt is stable for discharge.    Patient presents with seizure or seizure-like symptoms. I have no suspicion of an intracranial, traumatic, infectious, metabolic, toxic, cardiovascular (specifically arrhythmia), or other emergent medical condition requiring further intervention. Seizure precautions were discussed with the patient and/or caretaker, specifically not to swim unattended, not to operate motor vehicles or other machinery, and to avoid heights or other areas where falls may occur until cleared by primary care physician. Patient is safe for discharge.      ED Medication(s):  Medications - No data to display    Discharge Medication List as of 12/10/2017  6:26 PM                Medical Decision Making    Medical Decision Making:   Clinical Tests:   Lab  Tests: Reviewed and Ordered  Radiological Study: Reviewed and Ordered           Scribe Attestation:   Scribe #1: I performed the above scribed service and the documentation accurately describes the services I performed. I attest to the accuracy of the note.    Attending:   Physician Attestation Statement for Scribe #1: I, Lisa Sigala MD, personally performed the services described in this documentation, as scribed by Ghazala Robles, in my presence, and it is both accurate and complete.          Clinical Impression       ICD-10-CM ICD-9-CM   1. Post-ictal confusion F05 293.0   2. Lethargy R53.83 780.79       Disposition:   Disposition: Discharged  Condition: Stable         Lisa Sigala MD  12/10/17 2109

## 2017-12-11 NOTE — ED NOTES
In bed resting,aao,skin warm dry to touch,equal bilateral chest rise and fall,side rails up x 2,bed locked and in low position,C-monitor on and recording,instructed to call with any needs.

## 2017-12-11 NOTE — ED NOTES
Spoke to Pallavi mcginnis the pts nurse who states the pt no longer takes any seizure medications, also the nursing home was concerned about peg residual Dr. Sigala notified no new orders noted.

## 2017-12-11 NOTE — DISCHARGE INSTRUCTIONS
Need to check dilantin level and adjust dose with patients PCP or neurologist for further management and prevention of seizures

## 2018-01-03 ENCOUNTER — ANESTHESIA EVENT (OUTPATIENT)
Dept: ENDOSCOPY | Facility: HOSPITAL | Age: 43
End: 2018-01-03
Payer: MEDICAID

## 2018-01-03 ENCOUNTER — SURGERY (OUTPATIENT)
Age: 43
End: 2018-01-03

## 2018-01-03 ENCOUNTER — HOSPITAL ENCOUNTER (OUTPATIENT)
Facility: HOSPITAL | Age: 43
Discharge: HOME | End: 2018-01-03
Attending: INTERNAL MEDICINE | Admitting: INTERNAL MEDICINE
Payer: MEDICAID

## 2018-01-03 ENCOUNTER — ANESTHESIA (OUTPATIENT)
Dept: ENDOSCOPY | Facility: HOSPITAL | Age: 43
End: 2018-01-03
Payer: MEDICAID

## 2018-01-03 DIAGNOSIS — K22.2 ESOPHAGEAL STRICTURE: ICD-10-CM

## 2018-01-03 PROCEDURE — 63600175 PHARM REV CODE 636 W HCPCS: Performed by: NURSE ANESTHETIST, CERTIFIED REGISTERED

## 2018-01-03 PROCEDURE — 27201012 HC FORCEPS, HOT/COLD, DISP: Performed by: INTERNAL MEDICINE

## 2018-01-03 PROCEDURE — 43248 EGD GUIDE WIRE INSERTION: CPT

## 2018-01-03 PROCEDURE — 37000008 HC ANESTHESIA 1ST 15 MINUTES: Performed by: INTERNAL MEDICINE

## 2018-01-03 PROCEDURE — 37000009 HC ANESTHESIA EA ADD 15 MINS: Performed by: INTERNAL MEDICINE

## 2018-01-03 PROCEDURE — 00731 ANES UPR GI NDSC PX NOS: CPT | Performed by: INTERNAL MEDICINE

## 2018-01-03 PROCEDURE — 88305 TISSUE EXAM BY PATHOLOGIST: CPT | Performed by: PATHOLOGY

## 2018-01-03 PROCEDURE — 25000003 PHARM REV CODE 250: Performed by: NURSE ANESTHETIST, CERTIFIED REGISTERED

## 2018-01-03 PROCEDURE — 88305 TISSUE EXAM BY PATHOLOGIST: CPT | Mod: 26,,, | Performed by: PATHOLOGY

## 2018-01-03 PROCEDURE — 43202 ESOPHAGOSCOPY FLEX BIOPSY: CPT | Mod: ,,, | Performed by: INTERNAL MEDICINE

## 2018-01-03 RX ORDER — SODIUM CHLORIDE, SODIUM LACTATE, POTASSIUM CHLORIDE, CALCIUM CHLORIDE 600; 310; 30; 20 MG/100ML; MG/100ML; MG/100ML; MG/100ML
INJECTION, SOLUTION INTRAVENOUS CONTINUOUS PRN
Status: DISCONTINUED | OUTPATIENT
Start: 2018-01-03 | End: 2018-01-03

## 2018-01-03 RX ORDER — LIDOCAINE HCL/PF 100 MG/5ML
SYRINGE (ML) INTRAVENOUS
Status: DISCONTINUED | OUTPATIENT
Start: 2018-01-03 | End: 2018-01-03

## 2018-01-03 RX ORDER — POTASSIUM CHLORIDE 1.5 G/1.58G
20 POWDER, FOR SOLUTION ORAL DAILY
COMMUNITY

## 2018-01-03 RX ORDER — PROPOFOL 10 MG/ML
INJECTION, EMULSION INTRAVENOUS
Status: DISCONTINUED | OUTPATIENT
Start: 2018-01-03 | End: 2018-01-03

## 2018-01-03 RX ORDER — SODIUM CHLORIDE, SODIUM LACTATE, POTASSIUM CHLORIDE, CALCIUM CHLORIDE 600; 310; 30; 20 MG/100ML; MG/100ML; MG/100ML; MG/100ML
INJECTION, SOLUTION INTRAVENOUS CONTINUOUS
Status: DISCONTINUED | OUTPATIENT
Start: 2018-01-03 | End: 2018-01-03 | Stop reason: HOSPADM

## 2018-01-03 RX ADMIN — SODIUM CHLORIDE, SODIUM LACTATE, POTASSIUM CHLORIDE, AND CALCIUM CHLORIDE: 600; 310; 30; 20 INJECTION, SOLUTION INTRAVENOUS at 08:01

## 2018-01-03 RX ADMIN — LIDOCAINE HYDROCHLORIDE 50 MG: 20 INJECTION, SOLUTION INTRAVENOUS at 08:01

## 2018-01-03 RX ADMIN — PROPOFOL 80 MG: 10 INJECTION, EMULSION INTRAVENOUS at 08:01

## 2018-01-03 NOTE — DISCHARGE INSTRUCTIONS
Esophageal Dilation     A balloon dilator may be used to widen a stricture in the esophagus.   An esophageal dilation is a procedure used to widen a narrowed section of your esophagus. This is the tube that leads from your throat to your stomach. Narrowing (stricture) of the esophagus can cause problems. These include trouble swallowing. This sheet explains what to expect with esophageal dilation.  Why esophageal dilation is needed  Several problems can be treated with esophageal dilation. They include:  · Peptic stricture. This is caused by reflux esophagitis. With this problem, the esophagus is irritated by acid reflux (heartburn). This occurs when acid from your stomach flows back up into the esophagus. Stomach acid damages the lining of the esophagus. This leads to a buildup of scar tissue. As a result, the esophagus is narrowed.  · Schatzkis ring. This is an abnormal ring of tissue. It forms where the esophagus meets the stomach. It can cause trouble swallowing. It can also cause food to get stuck in the esophagus. The cause of this condition is not known.  · Achalasia. This condition stops food and liquids from moving into your stomach from the esophagus. It affects the lower esophageal sphincter (LES). The LES is a muscular ring that opens (relaxes) when you swallow. With achalasia, the LES does not relax. This condition can also cause problems with peristalsis. This is the normal muscular action of the esophagus that moves food into the stomach.  · Eosinophilic esophagitis. This is a redness and swelling (inflammation) in the esophagus. It is caused by an environmental trigger such as a food allergy. It can lead to pain, trouble swallowing, and strictures.  · Less common causes of stricture. Other causes of stricture include radiation treatment and cancer.  Before you have esophageal dilation  · Tell your provider about any medicines you take. This includes prescription medicines, over-the-counter  medicines, herbs, vitamins, and other supplements. Be sure to mention aspirin or any blood thinners youre taking.  · Let your provider know if you need to take antibiotics before dental procedures. You may need to take them before esophageal dilation as well.  · Tell your provider about any health conditions you have, such as heart or lung disease. Also mention any allergies to medicines.  · Youll need to have an empty stomach for the procedure. Follow your providers instructions for not eating and drinking before the procedure.  · Arrange to have a family member or friend drive you home after the procedure.  During the procedure  · You may be given local anesthesia to numb your throat. Youll also likely be given medicine to relax you. The procedure takes about 15 minutes. It does not cause trouble breathing.  · A tube called an endoscope (scope) is used. This is a narrow tube with a tiny light and camera at the end. The scope is inserted through your mouth and into your esophagus. It lets your provider see inside your esophagus. To help guide your provider, an imaging method called fluoroscopy may also be used. This creates a moving X-ray image on a computer screen.   · Next, special tiny tools are carefully guided through your mouth and down into the esophagus. They widen the stricture and are then removed. Different types of instruments are used. The type used depends on the size and cause of the stricture. Types include:  ¨ Balloon dilator. A tiny empty balloon is put into the stricture using an endoscope. The balloon is slowly filled with air. The air is removed from the balloon when the stricture is widened to the right size. Balloon dilators are used to treat many types of strictures.  ¨ Guided wire dilator. A thin wire is eased down the esophagus. A small tube thats wider on one end is guided down the wire. It is put into the stricture to stretch it. These dilators are used to treat all kinds of  strictures.  ¨ Bougies. These are weighted, cone-shaped tubes. Starting with smaller cones, your provider uses increasingly larger cones until the stricture is stretched the right amount. Bougies are often used to treat strictures that are simple (short, straight, and not very narrow).  After the procedure  · Youll be watched closely until your provider says youre ready to go home. Youll need to have a friend or family member drive you home.  · You may have a sore throat for the rest of the day.  · You may have pain behind your breastbone for a short time afterwards.  · You can start drinking fluids again after the numbness in your throat goes away. You can resume eating the same day or the next day.  Risks and possible complications  Risks and possible complications for esophageal dilation include:  · Infection  · A tear or hole in the esophagus lining, causing bleeding and possibly needing surgery to fix  · Risks of anesthesia  Follow-up  You may need to have the procedure repeated one or more times. This depends on the cause and extent of the narrowing. Repeat procedures can allow the dilation to take place more slowly. This reduces the risks of the procedure.  If your stricture was caused by reflux esophagitis, youll likely need to take medicine to treat that condition. Your provider will tell you more.  When to call your provider  Call your healthcare provider right away if you have any of the following after the procedure:  · Fever of 100.4°F (38.0°C)  · Chest pain  · Trouble swallowing  · Vomiting blood or material that looks like coffee grounds  · Bleeding  · Black, tarry, or bloody stools   Date Last Reviewed: 7/1/2016 © 2000-2017 [employer]. [address] [place] [address], [place] [address]. All rights reserved. This information is not intended as a substitute for professional medical care. Always follow your healthcare professional's instructions.        Esophagitis     With esophagitis, the  lining of the esophagus is inflamed.   Do you often have burning pain in your chest? You may have esophagitis. This is when the lining of the esophagus becomes red and swollen (inflamed). The esophagus is the tube that connects your throat to your stomach. This sheet tells you more about esophagitis. It also explains your treatment options.  Main types of esophagitis  Reflux esophagitis. This is the more common type. It is caused by GERD (gastroesophageal reflux disease). Stomach contents with stomach acid flow back up into the esophagus. This happens over and over. It leads to inflammation. Risk factors can include:  · Being overweight  · Asthma  · Smoking  · Pregnancy  · Frequent vomiting  · Certain medicines (such as aspirin and other anti-inflammatories)  · Hiatal hernia  Infectious esophagitis. This is caused by an infection. You are more at risk for this if you have a weakened immune system and poor nutrition. Antibiotic use can also be a factor. The infection is often due to the following:  · A type of fungus (typically candida)  · A virus, such as herpes simplex virus 1 (HSV-1) or cytomegalovirus (CMV)  Eosinophilic esophagitis. Foods or other things around you can give you an allergic reaction. This triggers an immune response and leads to esophagitis.  Pill-induced esophagitis. Certain types of medicines can cause inflammation and ulcers in the esophagus. These include doxycycline, aspirin, NSAIDs, alendronate, potassium, quinidine, iron.  Symptoms of esophagitis  The following symptoms can occur with esophagitis:  · Pain when swallowing, or trouble swallowing  · Pain behind your breastbone (heartburn)  · Acid regurgitation  · Chronic sore throat  · Gum Inflammation  · Cavities  · Bad breath  · Nausea  · Pain in your upper belly (abdomen)  · Bleeding (indicated by bright red vomit or black, tarry stool)  These symptoms occur more often with reflux esophagitis:  · Coughing, wheezing, or  asthma  · Hoarseness  Diagnosis of esophagitis  Your healthcare provider will ask about your health history and symptoms. Youll also be examined. Sometimes certain tests are needed. These may include:  · Upper endoscopy. A thin, flexible tube with a tiny light and camera is used. It is inserted through the mouth down into the esophagus. This lets the provider look for damage. A small sample of tissue (biopsy) may also be removed. The sample is sent to a lab for testing.  · Upper GI X-ray with barium. An X-ray is done after you drink a substance called barium. Barium may make problems in the esophagus easier to see on an x-ray.  · Esophageal pH. A soft, thin tube is passed into the esophagus through the nose or mouth for 24 hours. It measures the acid level in the esophagus.  · Esophageal manometry. A soft, thin tube is passed into the esophagus through the nose or mouth. It measures muscle contractions in the esophagus.  Treatment of esophagitis  Medicines. Different medicines can help treat esophagitis. The medicine used will depend on the type of esophagitis you have. Talk with your healthcare provider.  Lifestyle changes. Making the following changes can help reduce irritation and ease your symptoms:  · Avoid spicy foods (pepper, chili powder, pavon). Also avoid hard foods (nuts, crackers, raw vegetables) and acidic foods and drinks (tomatoes, citrus fruits and juices). Other problem foods include chocolate, peppermint, nutmeg, and foods high in fat.  · Until you can swallow without pain, follow a combined liquid and soft diet. Try foods such as cooked cereals, mashed potatoes, and soups.  · Take small bites and chew your food thoroughly.  · Avoid large meals and heavy evening meals. Don't lie down within 2 to 3 hours of eating.  · Get to or stay at a healthy weight.  · Avoid alcohol, caffeine, and smoking or tobacco products.  · Brush and floss your teeth  · Raise your upper body by 4 to 6 inches when lying in  bed. This can be done using a foam wedge. Or put blocks under the legs at the head of your bed.  Surgery. This may be needed for severe reflux esophagitis. Other noninvasive procedures to treat GERD and esophagitis are being studied. Your provider can tell you more.  Why treatment Is important  Without treatment, esophagitis can get worse. This is especially true with severe reflux esophagitis. For instance, continued symptoms can cause scarring of the esophagus. Over time, this can cause a narrowing the esophagus (stricture). This can make it hard to pass food down to the stomach. As symptoms go on they can also cause changes in the lining of the esophagus. These changes can put you at a slightly higher risk of cancer of the esophagus.   Date Last Reviewed: 7/1/2016  © 7627-7823 [employer]. [address] [place] [address], [place] [address]. All rights reserved. This information is not intended as a substitute for professional medical care. Always follow your healthcare professional's instructions.

## 2018-01-03 NOTE — ANESTHESIA PREPROCEDURE EVALUATION
01/03/2018  Belinda Urbina is a 42 y.o., female.    Anesthesia Evaluation    I have reviewed the Patient Summary Reports.    I have reviewed the Nursing Notes.   I have reviewed the Medications.     Review of Systems  Anesthesia Hx:  No problems with previous Anesthesia    Social:  Non-Smoker, No Alcohol Use    Hematology/Oncology:  Hematology Normal   Oncology Normal     EENT/Dental:EENT/Dental Normal   Cardiovascular:   Exercise tolerance: good    Pulmonary:  Pulmonary Normal    Hepatic/GI:   GERD, poorly controlled Peg tube   Musculoskeletal:  Musculoskeletal Normal    Neurological:   Seizures, well controlled    Endocrine:  Endocrine Normal    Dermatological:  Skin Normal    Psych:   Psychiatric History          Physical Exam   Airway/Jaw/Neck:  Airway Findings: Mallampati: III                Anesthesia Plan  Type of Anesthesia, risks & benefits discussed:  Anesthesia Type:  MAC  Patient's Preference:   Intra-op Monitoring Plan:   Intra-op Monitoring Plan Comments:   Post Op Pain Control Plan:   Post Op Pain Control Plan Comments:   Induction:   IV  Beta Blocker:  Patient is not currently on a Beta-Blocker (No further documentation required).       Informed Consent: Patient representative understands risks and agrees with Anesthesia plan.  Questions answered. Anesthesia consent signed with patient representative.  ASA Score: 2     Day of Surgery Review of History & Physical: I have interviewed and examined the patient. I have reviewed the patient's H&P dated: 01/03/17. There are no significant changes.  H&P update referred to the provider.         Ready For Surgery From Anesthesia Perspective.

## 2018-01-03 NOTE — ANESTHESIA RELEASE NOTE
Anesthesia Release from PACU Note    Patient: Belinda Major    Procedure(s) Performed: Procedure(s) (LRB):  ESOPHAGOGASTRODUODENOSCOPY (EGD) (N/A)    Anesthesia type: MAC    Post pain: Adequate analgesia    Post assessment: no apparent anesthetic complications, tolerated procedure well and no evidence of recall    Last Vitals:   Visit Vitals  /70 (BP Location: Left arm, Patient Position: Lying)   Pulse (!) 51   Temp 36.4 °C (97.5 °F) (Oral)   Resp 16   SpO2 100%   Breastfeeding? No       Post vital signs: stable    Level of consciousness: awake and alert     Nausea/Vomiting: no nausea/no vomiting    Complications: none    Airway Patency: patent    Respiratory: unassisted, spontaneous ventilation, room air    Cardiovascular: stable    Hydration: euvolemic

## 2018-01-03 NOTE — TRANSFER OF CARE
Anesthesia Transfer of Care Note    Patient: Belinda Major    Procedure(s) Performed: Procedure(s) (LRB):  ESOPHAGOGASTRODUODENOSCOPY (EGD) (N/A)    Patient location: PACU    Anesthesia Type: MAC    Transport from OR: Transported from OR on room air with adequate spontaneous ventilation    Post pain: adequate analgesia    Post assessment: no apparent anesthetic complications    Post vital signs: stable    Level of consciousness: sedated    Nausea/Vomiting: no nausea/vomiting    Complications: none    Transfer of care protocol was followed      Last vitals:   Visit Vitals  /70 (BP Location: Left arm, Patient Position: Lying)   Pulse (!) 51   Temp 36.4 °C (97.5 °F) (Oral)   Resp 16   SpO2 100%   Breastfeeding? No

## 2018-01-03 NOTE — H&P
Short Stay Endoscopy History and Physical    PCP - Santhosh Mendiola MD    Procedure - EGD  ASA - II  Mallampati - per anesthesia  History of Anesthesia problems - no  Family history Anesthesia problems -  no     HPI:    Reason for EGD: esophageal stricture  Heartburn: No  Dysphagia: yes  Follow up of ulcer: No  Anemia - no  GI bleeding - no  Abdominal pain: No  Diarrhea - no    ROS:  CONSTITUTIONAL: Denies weight change,  fatigue, fevers, chills, night sweats.  CARDIOVASCULAR: Denies chest pain, shortness of breath, orthopnea and edema.  RESPIRATORY: Denies cough, hemoptysis, dyspnea, and wheezing.  GI: See HPI.    Medical History:   Past Medical History:   Diagnosis Date    Anxiety     Aphasia     Cerebral palsy     Deaf     Depression     GERD (gastroesophageal reflux disease)     Insomnia     Pressure ulcer     Seizures        Surgical History:   Past Surgical History:   Procedure Laterality Date    GASTROSTOMY TUBE PLACEMENT         Family History:  History reviewed. No pertinent family history.    Social History:   Social History   Substance Use Topics    Smoking status: Unknown If Ever Smoked    Smokeless tobacco: Never Used    Alcohol use No      Comment: unable to assess       Allergies:   Review of patient's allergies indicates:  No Known Allergies    Medications:   No current facility-administered medications on file prior to encounter.      Current Outpatient Prescriptions on File Prior to Encounter   Medication Sig Dispense Refill    fluphenazine (PROLIXIN) 10 MG tablet 10 mg by Per G Tube route once daily.       trazodone (DESYREL) 100 MG tablet 100 mg by Per G Tube route every evening.       ziprasidone (GEODON) 20 MG Cap Take 20 mg by mouth 2 (two) times daily.      arginine-glutamine-calcium HMB (DOUG) 7-7-1.5 gram PwPk 1 packet by Per G Tube route once daily.      benztropine (COGENTIN) 1 MG tablet 1 mg by Per G Tube route 2 (two) times daily.      hydrOXYzine HCl (ATARAX)  25 MG tablet 25 mg by Per G Tube route 3 (three) times daily.      lansoprazole (PREVACID SOLUTAB) 30 MG disintegrating tablet Take 1 tablet (30 mg total) by mouth once daily. 30 tablet 11    lorazepam (ATIVAN) 1 MG tablet 1 mg by Per G Tube route every 6 (six) hours as needed for Anxiety.       megestrol (MEGACE) 40 MG Tab Take 40 mg by mouth once daily.      NEOMYCIN/POLYMYXIN B/DEXAMETHA (MAXITROL OPHT) Apply 3 drops to eye 4 (four) times daily. Right eye      olanzapine (ZYPREXA) 10 MG tablet 15 mg by Per G Tube route every evening.       propranolol (INDERAL) 10 MG tablet 10 mg by Per G Tube route 3 (three) times daily.      RABEprazole (ACIPHEX) 20 mg tablet 20 mg once daily.      ranitidine (ZANTAC) 300 MG capsule 300 mg by Per G Tube route every evening.      valsartan (DIOVAN) 80 MG tablet 80 mg by Per G Tube route once daily.       zolpidem (AMBIEN CR) 12.5 MG CR tablet Take 12.5 mg by mouth nightly as needed for Insomnia.         Physical Exam:  Vital Signs:   Vitals:    01/03/18 0724   BP: 110/68   Pulse: 60   Resp: 18   Temp: 97.5 °F (36.4 °C)     General Appearance: Well appearing in no acute distress  ENT: OP clear  Chest: CTA B  CV: RRR, no m/r/g  Abd: s/nt/nd/nabs  Ext: no edema    Labs:  Lab Results   Component Value Date    WBC 5.38 11/12/2017    HGB 10.9 (L) 11/12/2017    HCT 33.9 (L) 11/12/2017    MCV 78 (L) 11/12/2017     11/12/2017     No results found for: INR, PROTIME  No results found for: IRON, TIBC, FERRITIN, SATURATEDIRO      Assessment:  Patient Active Problem List   Diagnosis    Aspiration into respiratory tract    Cerebral palsy    Severe protein-calorie malnutrition    Esophageal stricture    Seizure disorder    Hypokalemia    Respiratory insufficiency     Esophageal stricture dilation    Plan:  I have explained the risks and benefits of endoscopy procedures to the sister including but not limited to bleeding, perforation, infection, and death. The sister  wishes to proceed.

## 2018-01-03 NOTE — PLAN OF CARE
ANESTHESIA BEING PAGED AS UNABLE TO OBTAIN IV ACCESS AFTER X 2 ATTEMPTS PER JAGUAR CONTRERAS RN AND X 1 PER MYSELF.

## 2018-01-03 NOTE — ANESTHESIA POSTPROCEDURE EVALUATION
Anesthesia Post Evaluation    Patient: Belinda Major    Procedure(s) Performed: Procedure(s) (LRB):  ESOPHAGOGASTRODUODENOSCOPY (EGD) (N/A)    Final Anesthesia Type: MAC  Patient location during evaluation: PACU  Patient participation: Yes- Able to Participate  Level of consciousness: awake and alert and oriented  Post-procedure vital signs: reviewed and stable  Pain management: adequate  Airway patency: patent  PONV status at discharge: No PONV  Anesthetic complications: no      Cardiovascular status: blood pressure returned to baseline  Respiratory status: unassisted, room air and spontaneous ventilation  Hydration status: euvolemic  Follow-up not needed.        Visit Vitals  /70 (BP Location: Left arm, Patient Position: Lying)   Pulse (!) 51   Temp 36.4 °C (97.5 °F) (Oral)   Resp 16   SpO2 100%   Breastfeeding? No       Pain/Tony Score: Pain Assessment Performed: Yes (1/3/2018  7:31 AM)  Presence of Pain: non-verbal indicators absent (1/3/2018  7:31 AM)  Tony Score: 9 (1/3/2018  8:31 AM)

## 2018-01-03 NOTE — PLAN OF CARE
PER ROBERTO AT [facility], PATIENT HAS NOTHING VIA PEG SINCE YESTERDAY AT 1800, NO MEDS THIS MORNING, AND MED LIST REVIEWED WITH [facility] NURSE AS WELL FOR ACCURACY.

## 2018-01-03 NOTE — PLAN OF CARE
ELIZA VANEGAS CLERK FROM [facility]UR[facility] HOME AT BEDSIDE. DR YU SPOKE TO  CARE GIVER. DISCHARGE INSTRUCTIONS GIVEN. WILL CALL NURSE AT [facility]URSING HOME TO GIVE REPORT.

## 2018-01-04 VITALS
OXYGEN SATURATION: 100 % | DIASTOLIC BLOOD PRESSURE: 71 MMHG | TEMPERATURE: 98 F | HEART RATE: 55 BPM | RESPIRATION RATE: 16 BRPM | SYSTOLIC BLOOD PRESSURE: 124 MMHG

## 2018-01-12 RX ORDER — FLUCONAZOLE 100 MG/1
100 TABLET ORAL DAILY
Qty: 14 TABLET | Refills: 0 | Status: SHIPPED | OUTPATIENT
Start: 2018-01-12 | End: 2018-01-26

## 2018-02-28 ENCOUNTER — HOSPITAL ENCOUNTER (OUTPATIENT)
Facility: HOSPITAL | Age: 43
Discharge: HOME | End: 2018-02-28
Attending: INTERNAL MEDICINE | Admitting: INTERNAL MEDICINE
Payer: MEDICAID

## 2018-02-28 ENCOUNTER — ANESTHESIA (OUTPATIENT)
Dept: ENDOSCOPY | Facility: HOSPITAL | Age: 43
End: 2018-02-28
Payer: MEDICAID

## 2018-02-28 ENCOUNTER — SURGERY (OUTPATIENT)
Age: 43
End: 2018-02-28

## 2018-02-28 ENCOUNTER — ANESTHESIA EVENT (OUTPATIENT)
Dept: ENDOSCOPY | Facility: HOSPITAL | Age: 43
End: 2018-02-28
Payer: MEDICAID

## 2018-02-28 VITALS
TEMPERATURE: 98 F | RESPIRATION RATE: 17 BRPM | BODY MASS INDEX: 18.85 KG/M2 | OXYGEN SATURATION: 98 % | HEART RATE: 70 BPM | SYSTOLIC BLOOD PRESSURE: 135 MMHG | DIASTOLIC BLOOD PRESSURE: 46 MMHG | HEIGHT: 60 IN | WEIGHT: 96 LBS

## 2018-02-28 DIAGNOSIS — K22.2 ESOPHAGEAL STRICTURE: ICD-10-CM

## 2018-02-28 PROCEDURE — 37000008 HC ANESTHESIA 1ST 15 MINUTES: Performed by: INTERNAL MEDICINE

## 2018-02-28 PROCEDURE — 00731 ANES UPR GI NDSC PX NOS: CPT | Performed by: INTERNAL MEDICINE

## 2018-02-28 PROCEDURE — 43235 EGD DIAGNOSTIC BRUSH WASH: CPT | Mod: ,,, | Performed by: INTERNAL MEDICINE

## 2018-02-28 PROCEDURE — 37000009 HC ANESTHESIA EA ADD 15 MINS: Performed by: INTERNAL MEDICINE

## 2018-02-28 PROCEDURE — 43235 EGD DIAGNOSTIC BRUSH WASH: CPT | Performed by: INTERNAL MEDICINE

## 2018-02-28 PROCEDURE — 25000003 PHARM REV CODE 250: Performed by: NURSE ANESTHETIST, CERTIFIED REGISTERED

## 2018-02-28 PROCEDURE — 63600175 PHARM REV CODE 636 W HCPCS: Performed by: NURSE ANESTHETIST, CERTIFIED REGISTERED

## 2018-02-28 RX ORDER — PROPOFOL 10 MG/ML
VIAL (ML) INTRAVENOUS
Status: DISCONTINUED | OUTPATIENT
Start: 2018-02-28 | End: 2018-02-28

## 2018-02-28 RX ORDER — LANSOPRAZOLE 30 MG/1
30 TABLET, ORALLY DISINTEGRATING, DELAYED RELEASE ORAL
Qty: 180 TABLET | Refills: 3 | Status: SHIPPED | OUTPATIENT
Start: 2018-02-28 | End: 2019-02-28

## 2018-02-28 RX ORDER — SODIUM CHLORIDE, SODIUM LACTATE, POTASSIUM CHLORIDE, CALCIUM CHLORIDE 600; 310; 30; 20 MG/100ML; MG/100ML; MG/100ML; MG/100ML
INJECTION, SOLUTION INTRAVENOUS CONTINUOUS PRN
Status: DISCONTINUED | OUTPATIENT
Start: 2018-02-28 | End: 2018-02-28

## 2018-02-28 RX ORDER — LIDOCAINE HCL/PF 100 MG/5ML
SYRINGE (ML) INTRAVENOUS
Status: DISCONTINUED | OUTPATIENT
Start: 2018-02-28 | End: 2018-02-28

## 2018-02-28 RX ADMIN — PROPOFOL 50 MG: 10 INJECTION, EMULSION INTRAVENOUS at 08:02

## 2018-02-28 RX ADMIN — SODIUM CHLORIDE, SODIUM LACTATE, POTASSIUM CHLORIDE, AND CALCIUM CHLORIDE: 600; 310; 30; 20 INJECTION, SOLUTION INTRAVENOUS at 08:02

## 2018-02-28 RX ADMIN — PROPOFOL 80 MG: 10 INJECTION, EMULSION INTRAVENOUS at 08:02

## 2018-02-28 RX ADMIN — LIDOCAINE HYDROCHLORIDE 80 ML: 20 INJECTION, SOLUTION INTRAVENOUS at 08:02

## 2018-02-28 NOTE — ANESTHESIA RELEASE NOTE
"Anesthesia Release from PACU Note    Patient: Belinda Major    Procedure(s) Performed: Procedure(s) (LRB):  ESOPHAGOGASTRODUODENOSCOPY (EGD) (N/A)    Anesthesia type: MAC    Post pain: Adequate analgesia    Post assessment: no apparent anesthetic complications and tolerated procedure well    Last Vitals:   Visit Vitals  BP 98/68 (Patient Position: Lying)   Pulse 65   Temp 36.5 °C (97.7 °F) (Oral)   Resp 18   Ht 5' 0.25" (1.53 m)   Wt 43.5 kg (96 lb)   SpO2 100%   Breastfeeding? No   BMI 18.59 kg/m²       Post vital signs: stable    Level of consciousness: awake, alert  and oriented    Nausea/Vomiting: no nausea/no vomiting    Complications: none    Airway Patency: patent    Respiratory: unassisted, spontaneous ventilation, room air    Cardiovascular: stable and blood pressure at baseline    Hydration: euvolemic  "

## 2018-02-28 NOTE — DISCHARGE INSTRUCTIONS
Esophagitis     With esophagitis, the lining of the esophagus is inflamed.   Do you often have burning pain in your chest? You may have esophagitis. This is when the lining of the esophagus becomes red and swollen (inflamed). The esophagus is the tube that connects your throat to your stomach. This sheet tells you more about esophagitis. It also explains your treatment options.  Main types of esophagitis  Reflux esophagitis. This is the more common type. It is caused by GERD (gastroesophageal reflux disease). Stomach contents with stomach acid flow back up into the esophagus. This happens over and over. It leads to inflammation. Risk factors can include:  · Being overweight  · Asthma  · Smoking  · Pregnancy  · Frequent vomiting  · Certain medicines (such as aspirin and other anti-inflammatories)  · Hiatal hernia  Infectious esophagitis. This is caused by an infection. You are more at risk for this if you have a weakened immune system and poor nutrition. Antibiotic use can also be a factor. The infection is often due to the following:  · A type of fungus (typically candida)  · A virus, such as herpes simplex virus 1 (HSV-1) or cytomegalovirus (CMV)  Eosinophilic esophagitis. Foods or other things around you can give you an allergic reaction. This triggers an immune response and leads to esophagitis.  Pill-induced esophagitis. Certain types of medicines can cause inflammation and ulcers in the esophagus. These include doxycycline, aspirin, NSAIDs, alendronate, potassium, quinidine, iron.  Symptoms of esophagitis  The following symptoms can occur with esophagitis:  · Pain when swallowing, or trouble swallowing  · Pain behind your breastbone (heartburn)  · Acid regurgitation  · Chronic sore throat  · Gum Inflammation  · Cavities  · Bad breath  · Nausea  · Pain in your upper belly (abdomen)  · Bleeding (indicated by bright red vomit or black, tarry stool)  These symptoms occur more often with reflux  esophagitis:  · Coughing, wheezing, or asthma  · Hoarseness  Diagnosis of esophagitis  Your healthcare provider will ask about your health history and symptoms. Youll also be examined. Sometimes certain tests are needed. These may include:  · Upper endoscopy. A thin, flexible tube with a tiny light and camera is used. It is inserted through the mouth down into the esophagus. This lets the provider look for damage. A small sample of tissue (biopsy) may also be removed. The sample is sent to a lab for testing.  · Upper GI X-ray with barium. An X-ray is done after you drink a substance called barium. Barium may make problems in the esophagus easier to see on an x-ray.  · Esophageal pH. A soft, thin tube is passed into the esophagus through the nose or mouth for 24 hours. It measures the acid level in the esophagus.  · Esophageal manometry. A soft, thin tube is passed into the esophagus through the nose or mouth. It measures muscle contractions in the esophagus.  Treatment of esophagitis  Medicines. Different medicines can help treat esophagitis. The medicine used will depend on the type of esophagitis you have. Talk with your healthcare provider.  Lifestyle changes. Making the following changes can help reduce irritation and ease your symptoms:  · Avoid spicy foods (pepper, chili powder, pavon). Also avoid hard foods (nuts, crackers, raw vegetables) and acidic foods and drinks (tomatoes, citrus fruits and juices). Other problem foods include chocolate, peppermint, nutmeg, and foods high in fat.  · Until you can swallow without pain, follow a combined liquid and soft diet. Try foods such as cooked cereals, mashed potatoes, and soups.  · Take small bites and chew your food thoroughly.  · Avoid large meals and heavy evening meals. Don't lie down within 2 to 3 hours of eating.  · Get to or stay at a healthy weight.  · Avoid alcohol, caffeine, and smoking or tobacco products.  · Brush and floss your teeth  · Raise your  upper body by 4 to 6 inches when lying in bed. This can be done using a foam wedge. Or put blocks under the legs at the head of your bed.  Surgery. This may be needed for severe reflux esophagitis. Other noninvasive procedures to treat GERD and esophagitis are being studied. Your provider can tell you more.  Why treatment Is important  Without treatment, esophagitis can get worse. This is especially true with severe reflux esophagitis. For instance, continued symptoms can cause scarring of the esophagus. Over time, this can cause a narrowing the esophagus (stricture). This can make it hard to pass food down to the stomach. As symptoms go on they can also cause changes in the lining of the esophagus. These changes can put you at a slightly higher risk of cancer of the esophagus.   Date Last Reviewed: 7/1/2016 © 2000-2017 [employer]. [address] [place] [id number]. All rights reserved. This information is not intended as a substitute for professional medical care. Always follow your healthcare professional's instructions.        Upper GI Endoscopy     During endoscopy, a long, flexible tube is used to view the inside of your upper GI tract.      Upper GI endoscopy allows your healthcare provider to look directly into the beginning of your gastrointestinal (GI) tract. The esophagus, stomach, and duodenum (the first part of the small intestine) make up the upper GI tract.   Before the exam  Follow these and any other instructions you are given before your endoscopy. If you dont follow the healthcare providers instructions carefully, the test may need to be canceled or done over:  · Don't eat or drink anything after midnight the night before your exam. If your exam is in the afternoon, drink only clear liquids in the morning. Don't eat or drink anything for 8 hours before the exam. In some cases, you may be able to take medicines with sips of water until 2 hours before the procedure. Speak  with your healthcare provider about this.   · Bring your X-rays and any other test results you have.  · Because you will be sedated, arrange for an adult to drive you home after the exam.  · Tell your healthcare provider before the exam if you are taking any medicines or have any medical problems.  The procedure  Here is what to expect:  · You will lie on the endoscopy table. Usually patients lie on the left side.  · You will be monitored and given oxygen.  · Your throat may be numbed with a spray or gargle. You are given medicine through an intravenous (IV) line that will help you relax and remain comfortable. You may be awake or asleep during the procedure.  · The healthcare provider will put the endoscope in your mouth and down your esophagus. It is thinner than most pieces of food that you swallow. It will not affect your breathing. The medicine helps keep you from gagging.  · Air is put into your GI tract to expand it. It can make you burp.  · During the procedure, the healthcare provider can take biopsies (tissue samples), remove abnormalities, such as polyps, or treat abnormalities through a variety of devices placed through the endoscope. You will not feel this.   · The endoscope carries images of your upper GI tract to a video screen. If you are awake, you may be able to look at the images.  · After the procedure is done, you will rest for a time. An adult must drive you home.  When to call your healthcare provider  Contact your healthcare provider if you have:  · Black or tarry stools, or blood in your stool  · Fever  · Pain in your belly that does not go away  · Nausea and vomiting, or vomiting blood   Date Last Reviewed: 7/1/2016  © 4360-3112 [employer]. [address] [place] [id number]. All rights reserved. This information is not intended as a substitute for professional medical care. Always follow your healthcare professional's instructions.

## 2018-02-28 NOTE — ANESTHESIA POSTPROCEDURE EVALUATION
"Anesthesia Post Evaluation    Patient: Belinda Major    Procedure(s) Performed: Procedure(s) (LRB):  ESOPHAGOGASTRODUODENOSCOPY (EGD) (N/A)    Final Anesthesia Type: MAC  Patient location during evaluation: GI PACU  Patient participation: Yes- Able to Participate  Level of consciousness: awake and alert and oriented  Post-procedure vital signs: reviewed and stable  Pain management: adequate  Airway patency: patent  PONV status at discharge: No PONV  Anesthetic complications: no      Cardiovascular status: blood pressure returned to baseline  Respiratory status: unassisted, room air and spontaneous ventilation  Hydration status: euvolemic  Follow-up not needed.        Visit Vitals  BP 98/68 (Patient Position: Lying)   Pulse 65   Temp 36.5 °C (97.7 °F) (Oral)   Resp 18   Ht 5' 0.25" (1.53 m)   Wt 43.5 kg (96 lb)   SpO2 100%   Breastfeeding? No   BMI 18.59 kg/m²       Pain/Tony Score: Presence of Pain: denies (2/28/2018  8:18 AM)  Tony Score: 9 (2/28/2018  8:18 AM)      "

## 2018-02-28 NOTE — H&P
Short Stay Endoscopy History and Physical    PCP - Santhosh Mendiola MD    Procedure - EGD  ASA - II  Mallampati - per anesthesia  History of Anesthesia problems - no  Family history Anesthesia problems -  no     HPI:    Reason for EGD: esophageal stricture.  Heartburn: No  Dysphagia: yes  Follow up of ulcer: No  Anemia - no  GI bleeding - no  Abdominal pain: No  Diarrhea - no    Discussed with sister over the phone    ROS:  CONSTITUTIONAL: Denies weight change,  fatigue, fevers, chills, night sweats.  CARDIOVASCULAR: Denies chest pain, shortness of breath, orthopnea and edema.  RESPIRATORY: Denies cough, hemoptysis, dyspnea, and wheezing.  GI: See HPI.    Medical History:   Past Medical History:   Diagnosis Date    Anxiety     Aphasia     Cerebral palsy     Deaf     Depression     GERD (gastroesophageal reflux disease)     Insomnia     Pressure ulcer     Seizures        Surgical History:   Past Surgical History:   Procedure Laterality Date    GASTROSTOMY TUBE PLACEMENT         Family History:  History reviewed. No pertinent family history.    Social History:   Social History   Substance Use Topics    Smoking status: Unknown If Ever Smoked    Smokeless tobacco: Never Used    Alcohol use No      Comment: unable to assess       Allergies:   Review of patient's allergies indicates:  No Known Allergies    Medications:   No current facility-administered medications on file prior to encounter.      Current Outpatient Prescriptions on File Prior to Encounter   Medication Sig Dispense Refill    fluphenazine (PROLIXIN) 10 MG tablet 10 mg by Per G Tube route once daily.       lansoprazole (PREVACID SOLUTAB) 30 MG disintegrating tablet Take 1 tablet (30 mg total) by mouth once daily. 30 tablet 11    olanzapine (ZYPREXA) 10 MG tablet 15 mg by Per G Tube route every evening.       phenytoin (DILANTIN-125) 125 mg/5 mL suspension 4 mLs (100 mg total) by Per G Tube route 3 (three) times daily. 237 mL 3     potassium chloride (KLOR-CON) 20 mEq Pack Take 20 mEq by mouth once daily.      ranitidine (ZANTAC) 300 MG capsule 300 mg by Per G Tube route every evening.      trazodone (DESYREL) 100 MG tablet 100 mg by Per G Tube route every evening.       ziprasidone (GEODON) 20 MG Cap Take 20 mg by mouth 2 (two) times daily.      zolpidem (AMBIEN CR) 12.5 MG CR tablet Take 12.5 mg by mouth nightly as needed for Insomnia.      arginine-glutamine-calcium HMB (DOUG) 7-7-1.5 gram PwPk 1 packet by Per G Tube route once daily.      lorazepam (ATIVAN) 1 MG tablet 1 mg by Per G Tube route every 6 (six) hours as needed for Anxiety.       NEOMYCIN/POLYMYXIN B/DEXAMETHA (MAXITROL OPHT) Apply 3 drops to eye 4 (four) times daily. Right eye      [DISCONTINUED] benztropine (COGENTIN) 1 MG tablet 1 mg by Per G Tube route 2 (two) times daily.      [DISCONTINUED] hydrOXYzine HCl (ATARAX) 25 MG tablet 25 mg by Per G Tube route 3 (three) times daily.      [DISCONTINUED] megestrol (MEGACE) 40 MG Tab Take 40 mg by mouth once daily.      [DISCONTINUED] propranolol (INDERAL) 10 MG tablet 10 mg by Per G Tube route 3 (three) times daily.      [DISCONTINUED] RABEprazole (ACIPHEX) 20 mg tablet 20 mg once daily.      [DISCONTINUED] valsartan (DIOVAN) 80 MG tablet 80 mg by Per G Tube route once daily.          Physical Exam:  Vital Signs:   Vitals:    02/28/18 0736   BP: 108/76   Pulse: 61   Resp: 20   Temp: 97.7 °F (36.5 °C)     General Appearance: Well appearing in no acute distress  ENT: OP clear  Chest: CTA B  CV: RRR, no m/r/g  Abd: s/nt/nd/nabs  Ext: no edema    Labs:  Lab Results   Component Value Date    WBC 5.38 11/12/2017    HGB 10.9 (L) 11/12/2017    HCT 33.9 (L) 11/12/2017    MCV 78 (L) 11/12/2017     11/12/2017     No results found for: INR, PROTIME  No results found for: IRON, TIBC, FERRITIN, SATURATEDIRO      Assessment:  Patient Active Problem List   Diagnosis    Aspiration into respiratory tract    Cerebral palsy     Severe protein-calorie malnutrition    Esophageal stricture    Seizure disorder    Hypokalemia    Respiratory insufficiency     Esophageal stricture    Plan:  I have explained the risks and benefits of endoscopy procedures to the patient's sister including but not limited to bleeding, perforation, infection, and death. The patient's sister wishes to proceed.

## 2018-02-28 NOTE — TRANSFER OF CARE
"Anesthesia Transfer of Care Note    Patient: Belinda Major    Procedure(s) Performed: Procedure(s) (LRB):  ESOPHAGOGASTRODUODENOSCOPY (EGD) (N/A)    Patient location: GI    Anesthesia Type: MAC    Post pain: adequate analgesia    Post assessment: no apparent anesthetic complications    Post vital signs: stable    Level of consciousness: awake, alert and oriented    Nausea/Vomiting: no nausea/vomiting    Complications: none    Transfer of care protocol was followed      Last vitals:   Visit Vitals  BP 98/68 (Patient Position: Lying)   Pulse 65   Temp 36.5 °C (97.7 °F) (Oral)   Resp 18   Ht 5' 0.25" (1.53 m)   Wt 43.5 kg (96 lb)   SpO2 100%   Breastfeeding? No   BMI 18.59 kg/m²     "

## 2018-03-29 ENCOUNTER — HOSPITAL ENCOUNTER (EMERGENCY)
Facility: HOSPITAL | Age: 43
Discharge: HOME | End: 2018-03-29
Attending: EMERGENCY MEDICINE
Payer: MEDICAID

## 2018-03-29 VITALS
WEIGHT: 78.5 LBS | RESPIRATION RATE: 18 BRPM | HEART RATE: 94 BPM | DIASTOLIC BLOOD PRESSURE: 72 MMHG | BODY MASS INDEX: 13.91 KG/M2 | SYSTOLIC BLOOD PRESSURE: 114 MMHG | OXYGEN SATURATION: 100 % | HEIGHT: 63 IN | TEMPERATURE: 99 F

## 2018-03-29 DIAGNOSIS — R06.02 SOB (SHORTNESS OF BREATH): ICD-10-CM

## 2018-03-29 PROCEDURE — 99284 EMERGENCY DEPT VISIT MOD MDM: CPT

## 2018-03-29 RX ORDER — METOCLOPRAMIDE 10 MG/1
10 TABLET ORAL 2 TIMES DAILY
COMMUNITY

## 2018-03-29 RX ORDER — HALOPERIDOL 1 MG/1
1 TABLET ORAL 4 TIMES DAILY PRN
COMMUNITY

## 2018-03-29 RX ORDER — GUAIFENESIN/DEXTROMETHORPHAN 100-10MG/5
5 SYRUP ORAL
COMMUNITY

## 2018-03-29 RX ORDER — ONDANSETRON 4 MG/1
4 TABLET, FILM COATED ORAL EVERY 6 HOURS PRN
COMMUNITY

## 2018-03-29 NOTE — ED NOTES
[facility] staff given reports and transferring pt home. Pt interacts well with caregivers. Resp nonlabored.

## 2018-03-29 NOTE — ED PROVIDER NOTES
Encounter Date: 3/29/2018       History     Chief Complaint   Patient presents with    Shortness of Breath     [place] [facility]. on arrival no sob noted.     The history is provided by the nursing home and the EMS personnel.   Shortness of Breath   This is a new problem. The average episode lasts 30 minutes. The problem occurs intermittently.The current episode started less than 1 hour ago. The problem has been resolved. Pertinent negatives include no fever, no rhinorrhea, no sore throat, no cough, no sputum production, no hemoptysis, no wheezing, no orthopnea, no chest pain, no syncope, no rash, no leg pain and no leg swelling. She has had prior hospitalizations. She has had prior ED visits. Associated medical issues do not include asthma, COPD, CAD, heart failure, past MI or DVT.    home staff noted patient to be short of breath and oxygen saturation in the low 90s this morning.  They relate that the patient has recently put on Levaquin and they assume that was due to a recently diagnosed pneumonia however they were not sure.  They called [facility] ambulance service and on arrival EMS states the patient had clear lungs was not short of breath and satting 100% on room air.      Review of patient's allergies indicates:   Allergen Reactions    Augmentin [amoxicillin-pot clavulanate]     Penicillins     Sulfa (sulfonamide antibiotics)      Past Medical History:   Diagnosis Date    Anxiety     Aphasia     Cerebral palsy     Deaf     Depression     GERD (gastroesophageal reflux disease)     Insomnia     Pressure ulcer     Seizures      Past Surgical History:   Procedure Laterality Date    GASTROSTOMY TUBE PLACEMENT       Family History   Problem Relation Age of Onset    Family history unknown: Yes     Social History   Substance Use Topics    Smoking status: Unknown If Ever Smoked    Smokeless tobacco: Never Used    Alcohol use No      Comment: unable to assess     Review of Systems  "  Constitutional: Negative for fever.   HENT: Negative for rhinorrhea and sore throat.    Respiratory: Positive for shortness of breath. Negative for cough, hemoptysis, sputum production and wheezing.    Cardiovascular: Negative for chest pain, orthopnea, leg swelling and syncope.   Gastrointestinal: Negative for nausea.   Genitourinary: Negative for dysuria.   Musculoskeletal: Negative for back pain.   Skin: Negative for rash.   Neurological: Negative for weakness.   Hematological: Does not bruise/bleed easily.   All other systems reviewed and are negative.      Physical Exam     Initial Vitals [03/29/18 0747]   BP Pulse Resp Temp SpO2   113/76 93 20 98.5 °F (36.9 °C) 99 %      MAP       88.33         Vitals:    03/29/18 0747 03/29/18 0748 03/29/18 0847   BP: 113/76 113/76 114/74   Pulse: 93 94 88   Resp: 20 18 19   Temp: 98.5 °F (36.9 °C)     TempSrc: Oral     SpO2: 99% 98% 99%   Weight: 35.6 kg (78 lb 8 oz)     Height: 5' 3" (1.6 m)       Physical Exam    Nursing note and vitals reviewed.  Constitutional: She appears well-developed and well-nourished. No distress.   Thin, comfortable, chronic contractures   HENT:   Head: Normocephalic and atraumatic.   Eyes: EOM are normal. Pupils are equal, round, and reactive to light.   Neck: Normal range of motion. Neck supple.   Cardiovascular: Normal rate, regular rhythm, normal heart sounds and intact distal pulses.   Pulmonary/Chest: Breath sounds normal. No stridor. No respiratory distress. She has no wheezes. She has no rhonchi. She has no rales.   Abdominal: Soft. Bowel sounds are normal. She exhibits no mass. There is no rebound and no guarding.   Musculoskeletal: Normal range of motion. She exhibits no edema.   Chronic contractures   Neurological: She is alert. She has normal strength. No sensory deficit.   Baseline per nursing home staff   Skin: Skin is warm and dry. Capillary refill takes less than 2 seconds. No rash noted.   Psychiatric: She has a normal mood and " affect. Her behavior is normal.         ED Course   Procedures  Labs Reviewed - No data to display     Imaging Results          X-Ray Chest AP Portable (Final result)  Result time 03/29/18 08:40:19    Final result by DENIS Bran Sr., MD (03/29/18 08:40:19)                 Impression:      There is no evidence of an acute pulmonary process. There is a persistent mild amount of haziness in the expected location of the patient's breasts being projected over the thorax.       Electronically signed by: DENIS BRAN MD  Date:     03/29/18  Time:    08:40              Narrative:    One-view chest x-ray    Clinical History: Shortness of breath    Finding: Comparison was made to prior examination performed on 12/10/2017. The size of the heart is normal. There is no evidence of an acute pulmonary process. There is a persistent mild amount of haziness in the expected location of the patient's breasts being projected over the thorax. There is no pneumothorax.  The costophrenic angles are sharp.                                             8:47 AM Dr. Almanza paged       Dr. Almanza is familiar with the patient and states he did start the patient on Levaquin yesterday because she did have some concerns for an upper respiratory tract infection.  He talked to the nurses and understood that they were concerned because she was having some wheezing and shortness of breath today and I related my exam here with a chest x-ray and he agrees with no further workup at this time.  He recommends sending the patient back to the nursing home he will continue Levaquin and check on the patient there.      Clinical Impression:   The encounter diagnosis was SOB (shortness of breath).    Disposition:   Disposition: Discharged  Condition: Stable                        Trevor Cummins MD  03/29/18 0917

## 2018-03-29 NOTE — ED NOTES
Report given to Toan the pt nurse at [facility] wendy Narvaez was requested at this time for the pt.

## 2018-04-06 ENCOUNTER — HOSPITAL ENCOUNTER (EMERGENCY)
Facility: HOSPITAL | Age: 43
Discharge: HOME | End: 2018-04-06
Attending: EMERGENCY MEDICINE
Payer: MEDICAID

## 2018-04-06 VITALS
TEMPERATURE: 99 F | WEIGHT: 69 LBS | HEART RATE: 80 BPM | SYSTOLIC BLOOD PRESSURE: 126 MMHG | DIASTOLIC BLOOD PRESSURE: 79 MMHG | OXYGEN SATURATION: 97 % | HEIGHT: 63 IN | BODY MASS INDEX: 12.23 KG/M2 | RESPIRATION RATE: 18 BRPM

## 2018-04-06 DIAGNOSIS — R05.9 COUGH: Primary | ICD-10-CM

## 2018-04-06 DIAGNOSIS — R06.02 SOB (SHORTNESS OF BREATH): ICD-10-CM

## 2018-04-06 DIAGNOSIS — J02.9 ACUTE PHARYNGITIS, UNSPECIFIED ETIOLOGY: ICD-10-CM

## 2018-04-06 PROCEDURE — 99284 EMERGENCY DEPT VISIT MOD MDM: CPT

## 2018-04-06 RX ORDER — AZITHROMYCIN 200 MG/5ML
10 POWDER, FOR SUSPENSION ORAL DAILY
Qty: 40 ML | Refills: 0 | Status: SHIPPED | OUTPATIENT
Start: 2018-04-06 | End: 2018-04-11

## 2018-04-06 RX ORDER — PREDNISOLONE SODIUM PHOSPHATE 15 MG/5ML
30 SOLUTION ORAL DAILY
Qty: 25 ML | Refills: 0 | Status: SHIPPED | OUTPATIENT
Start: 2018-04-06 | End: 2018-04-11

## 2018-04-06 RX ORDER — IPRATROPIUM BROMIDE AND ALBUTEROL SULFATE 2.5; .5 MG/3ML; MG/3ML
3 SOLUTION RESPIRATORY (INHALATION) EVERY 6 HOURS PRN
Qty: 60 VIAL | Refills: 0 | Status: SHIPPED | OUTPATIENT
Start: 2018-04-06 | End: 2019-04-06

## 2018-04-06 NOTE — ED PROVIDER NOTES
Encounter Date: 4/6/2018       History     Chief Complaint   Patient presents with    Sore Throat     decreased O2 sats and SOB per [facility]      The history is provided by the nursing home, the EMS personnel and a relative.   Shortness of Breath   This is a recurrent problem. The average episode lasts 1 hour. The problem occurs intermittently.The current episode started 3 to 5 hours ago. Associated symptoms include sore throat and cough. Pertinent negatives include no fever, no headaches, no rhinorrhea, no ear pain, no neck pain, no sputum production, no hemoptysis, no wheezing, no chest pain, no syncope, no vomiting, no rash, no leg pain and no leg swelling. She has tried nothing for the symptoms. Associated medical issues do not include asthma, COPD, pneumonia, CAD, heart failure or past MI.     Patient has had multiple episodes at the nursing home where they have had some difficulties obtaining accurate sats the patient appeared to be short of breath.  She was recently treated with Levaquin prophylactically for possible pneumonia however the chest x-ray remained normal.  Patient is nonverbal it is difficult to get accurate communication with the family believes that she is also complaining that her throat hurts.      Review of patient's allergies indicates:   Allergen Reactions    Augmentin [amoxicillin-pot clavulanate]     Penicillins     Sulfa (sulfonamide antibiotics)      Past Medical History:   Diagnosis Date    Anxiety     Aphasia     Cerebral palsy     Deaf     Depression     GERD (gastroesophageal reflux disease)     Insomnia     Pressure ulcer     Seizures      Past Surgical History:   Procedure Laterality Date    GASTROSTOMY TUBE PLACEMENT       Family History   Problem Relation Age of Onset    Family history unknown: Yes     Social History   Substance Use Topics    Smoking status: Unknown If Ever Smoked    Smokeless tobacco: Never Used    Alcohol use No      Comment: unable to  "assess       (ROS per family)  Review of Systems   Unable to perform ROS: Patient nonverbal   Constitutional: Negative for chills and fever.   HENT: Positive for sore throat. Negative for ear pain and rhinorrhea.    Respiratory: Positive for cough and shortness of breath. Negative for hemoptysis, sputum production and wheezing.    Cardiovascular: Negative for chest pain, leg swelling and syncope.   Gastrointestinal: Negative for nausea and vomiting.   Genitourinary: Negative for dysuria.   Musculoskeletal: Negative for back pain and neck pain.   Skin: Negative for rash.   Neurological: Negative for weakness and headaches.   Hematological: Does not bruise/bleed easily.   All other systems reviewed and are negative.      Physical Exam     Initial Vitals [04/06/18 1609]   BP Pulse Resp Temp SpO2   123/87 66 18 98.7 °F (37.1 °C) (!) 93 %      MAP       99         Vitals:    04/06/18 1609 04/06/18 1643 04/06/18 1703 04/06/18 1716   BP: 123/87      Pulse: 66 81 81 78   Resp: 18 20 (!) 22 19   Temp: 98.7 °F (37.1 °C)      TempSrc: Axillary      SpO2: 97% 99% 100% 100%   Weight: 31.3 kg (69 lb)      Height: 5' 3" (1.6 m)       04/06/18 1802   BP: 126/79   Pulse: 80   Resp: 18   Temp:    TempSrc:    SpO2: 97%   Weight:    Height:      Physical Exam    Nursing note and vitals reviewed.  Constitutional: She appears well-developed. No distress.   Thin cachectic/bedbound.  Patient smiles and waves and appears to be baseline mental status per her family.  She does not appear to be in any distress   HENT:   Head: Normocephalic and atraumatic.   Mouth/Throat: Mucous membranes are normal. No oral lesions. No trismus in the jaw. No dental abscesses. Posterior oropharyngeal erythema present. No oropharyngeal exudate, posterior oropharyngeal edema or tonsillar abscesses.   Eyes: EOM are normal. Pupils are equal, round, and reactive to light.   Neck: Trachea normal and normal range of motion. Neck supple. No thyroid mass present. No " stridor present. No tracheal tenderness, no spinous process tenderness and no muscular tenderness present. No edema, no erythema and normal range of motion present. No neck rigidity.   Mild bilateral anterior cervical lymphadenopathy-does not appear tender to palpation   Cardiovascular: Normal rate, regular rhythm, normal heart sounds and intact distal pulses.   Pulmonary/Chest: Breath sounds normal. No stridor. She has no wheezes. She has no rhonchi.   Abdominal: Soft. Bowel sounds are normal. She exhibits no mass. There is no tenderness. There is no rigidity, no rebound and no guarding.   + feeding tube-no signs of infection   Musculoskeletal: Normal range of motion. She exhibits no edema.   Chronic contractures in the extremities   Neurological: She is alert and oriented to person, place, and time. She has normal strength. No sensory deficit.   Skin: Skin is warm and dry. Capillary refill takes less than 2 seconds. No rash noted.   Psychiatric: She has a normal mood and affect. Her behavior is normal. Judgment and thought content normal.         ED Course   Procedures  Labs Reviewed - No data to display     Imaging Results          X-Ray Chest AP Portable (Final result)  Result time 04/06/18 16:58:34    Final result by Enrique Loera MD (04/06/18 16:58:34)                 Impression:       No acute process seen.      Electronically signed by: ENRIQUE LOERA MD  Date:     04/06/18  Time:    16:58              Narrative:    Clinical Data:Shortness of breath    Comparison:  none    Findings:  Two view of the chest.   Opacity seen on the right lateral margin of the thorax which is thought to be external to the patient. Aorta demonstrates atherosclerotic disease.    Cardiac silhouette is normal.  The lungs demonstrate no evidence of active disease.  No evidence of pleural effusion or pneumothorax.  Bones appear intact.                                             Initial oxygenation on room air is incorrectly charted  at 93%.  It was actually 98% on room air when appropriate adjusted by respiratory therapy.     5:16 PM I spoke with Dr. Almanza and he is familiar with the patient.  It's unclear what episode the patient had at the nursing home.  She again looks comfortable no nasal flare, no tachypnea, no accessory muscle use-and normal oxygen saturations here in emergency department.  Dr. Almanza is familiar the patient and requests that I add a Zithromax to treat a possible pharyngitis as the family feels that she might be complaining about some slight pain in her throat.  The patient does have mild erythema posterior oropharynx but there is no swelling or evidence of abscess and no exudate.  No significant lymphadenopathy and no meningeal signs.    Dr. Almanza also requests that we requested nursing home arrange follow-up with pulmonologist for further investigation.        Clinical Impression:   The primary encounter diagnosis was Cough. Diagnoses of SOB (shortness of breath) and Acute pharyngitis, unspecified etiology were also pertinent to this visit.    Disposition:   Disposition: Discharged  Condition: Stable                        Trevor Cummins MD  04/07/18 0653

## 2018-04-06 NOTE — ED NOTES
Spoke with Ann Marie at [facility], all discharge instructions given. Aware of prescriptions and need to follow-up with pulmonology. Will arrange transport back home.

## 2023-05-11 NOTE — ANESTHESIA PREPROCEDURE EVALUATION
11/11/2017  Belinda Urbina is a 42 y.o., female.    Anesthesia Evaluation    I have reviewed the Patient Summary Reports.    I have reviewed the Nursing Notes.   I have reviewed the Medications.     Review of Systems  Anesthesia Hx:  No problems with previous Anesthesia  Denies Family Hx of Anesthesia complications.   Denies Personal Hx of Anesthesia complications.   Social:  No Alcohol Use, Non-Smoker    Hematology/Oncology:     Oncology Normal    -- Anemia:   EENT/Dental:   Deaf   Cardiovascular:   Denies Hypertension.  Denies MI.   Denies CABG/stent.      ECG has been reviewed. ekg 11/2017:  Normal sinus rhythm 77 with sinus arrhythmia  Possible Left atrial enlargement  LVH  Abnormal ECG  When compared with ECG of 19-OCT-2017 21:55,  QT has shortened   Pulmonary:   Denies COPD.  Denies Asthma.  Denies Sleep Apnea. ett in situ on vent with propofol gtt   Renal/:  Renal/ Normal     Hepatic/GI:   GERD Denies Liver Disease. Denies Hepatitis. Aphasia   Neurological:   Denies CVA. Seizures Cerebral palsy   Endocrine:  Endocrine Normal    Dermatological:   Pressure ulcer   Psych:   anxiety depression          Physical Exam  General:  Malnutrition    Airway/Jaw/Neck:  Airway Findings: Mouth Opening: Normal Tongue: Normal  Pre-Existing Airway Tube(s): Oral Endotracheal tube  Size: 7.5 at 21cm      Dental:  Dental Findings:   Chest/Lungs:  Chest/Lungs Findings: Clear to auscultation, Normal Respiratory Rate     Heart/Vascular:  Heart Findings: Rate: Bradycardia  Rhythm: Regular Rhythm  Sounds: Normal             Anesthesia Plan  Type of Anesthesia, risks & benefits discussed:  Anesthesia Type:  MAC  Patient's Preference:   Intra-op Monitoring Plan: standard ASA monitors  Intra-op Monitoring Plan Comments:   Post Op Pain Control Plan:   Post Op Pain Control Plan Comments:   Induction:   IV  Beta Blocker:  Patient  Referral ordered   is on a Beta-Blocker and has received one dose within the past 24 hours (No further documentation required).       Informed Consent: Patient representative understands risks and agrees with Anesthesia plan.  Questions answered. Anesthesia consent signed with patient representative.  ASA Score: 3     Day of Surgery Review of History & Physical: I have interviewed and examined the patient. I have reviewed the patient's H&P dated:  There are no significant changes.  H&P update referred to the surgeon.         Ready For Surgery From Anesthesia Perspective.

## 2023-05-23 NOTE — ANESTHESIA PREPROCEDURE EVALUATION
02/28/2018  Belinda Urbina is a 42 y.o., female.    Pre-op Assessment    I have reviewed the Patient Summary Reports.     I have reviewed the Nursing Notes.   I have reviewed the Medications.     Review of Systems  Anesthesia Hx:  No problems with previous Anesthesia    Social:  Non-Smoker, No Alcohol Use    Hematology/Oncology:  Hematology Normal   Oncology Normal     EENT/Dental:EENT/Dental Normal   Cardiovascular:   Exercise tolerance: good    Pulmonary:  Pulmonary Normal    Hepatic/GI:   GERD, poorly controlled Peg tube   Musculoskeletal:  Musculoskeletal Normal    Neurological:   Seizures, well controlled    Endocrine:  Endocrine Normal    Dermatological:  Skin Normal    Psych:   Psychiatric History          Physical Exam   Airway/Jaw/Neck:  Airway Findings: Mallampati: III                Anesthesia Plan  Type of Anesthesia, risks & benefits discussed:  Anesthesia Type:  MAC  Patient's Preference:   Intra-op Monitoring Plan:   Intra-op Monitoring Plan Comments:   Post Op Pain Control Plan:   Post Op Pain Control Plan Comments:   Induction:   IV  Beta Blocker:  Patient is not currently on a Beta-Blocker (No further documentation required).       Informed Consent: Patient representative understands risks and agrees with Anesthesia plan.  Questions answered. Anesthesia consent signed with patient representative.  ASA Score: 2     Day of Surgery Review of History & Physical: I have interviewed and examined the patient. I have reviewed the patient's H&P dated: 01/03/17. There are no significant changes.  H&P update referred to the provider.         Ready For Surgery From Anesthesia Perspective.        Physical Therapy Aquatic Flow Sheet  Date:  5/23/2023    Patient Name:  Michael Almodovar    Restrictions:    Diagnosis:  Chronic pain syndrome [G89.4]  Treatment Diagnosis:    Insurance/Certification information:  medicare and medicaid  Plan of care signed (Y/N):  sent  Visit# / total visits:  1/16  Pain level: /10   Electronically signed by:  Ginger Morales, PT, PT    Medicare Cap (if applicable):  312 = total amount used, updated 5/23/2023    Key  B= Belt DB= Dumbells T= Theratube   H= Hydrotone N= Noodles W= Weights   P= Paddles S= Speedo equipment K= Kickboard     Exercises/Activities   Warm-up/Amb    Exercises      Slow forward  2 laps  HR/TR  15x    Slow sideways  2 laps  Marches  2 min    Slow backwards  2 laps  Mini-squats  15x    Medium forward    4-way SLR  15x B    Medium sideways    Hip circles/fig 8  15x B    Small shuffle    Hamstring curls  15x B    Jog    Knee extension  15x B    Braiding    Pelvic tilts  10x5\"    Bicycling  2 min  Scap squeezes  10x5\"        Shoulder flex/ext  15x B    Functional    Shoulder abd/add  15x B    Step  nv  Shoulder H. abd/add  15x B    Lifting    Shoulder IR/ER      Hand to opp knee    Rowing      Push down squat    Bilateral pull down      UE PNF    Push/pull      LE PNF    Push downs      Wall push ups    Arm circles      SLS  nv  Elbow flex/ext          Chin tuck      Stretching    UT shrugs/rolls      Gastroc/Soleus  2x20\" B  Rocking horse      Hamstring          SKTC  nv  Other      Piriformis    Tandem stance  2x30\" B    Hip flexor          Ladder pull          Pec stretch          Post deltoid           Time In:  2:45    Timed Code Treatment Minutes:  15    Total Treatment Minutes:  40    Treatment/Activity Tolerance:   [x] Patient tolerated treatment well [] Patient limited by fatigue   [] Patient limited by pain [] Patient limited by other medical complications  [] Other:     Prognosis: [x] Good [] Fair  [] Poor    Patient Requires Follow-up:  [x] Yes  []

## 2024-07-02 NOTE — ASSESSMENT & PLAN NOTE
Monitor closely and continue home Phenytoin.  No seizure here-- stable   Pt called back. Msg was read for pt. Pt stated would like to speak with Harinder.